# Patient Record
Sex: FEMALE
[De-identification: names, ages, dates, MRNs, and addresses within clinical notes are randomized per-mention and may not be internally consistent; named-entity substitution may affect disease eponyms.]

---

## 2020-04-04 ENCOUNTER — APPOINTMENT (OUTPATIENT)
Dept: DISASTER EMERGENCY | Facility: CLINIC | Age: 65
End: 2020-04-04

## 2021-09-09 PROBLEM — Z00.00 ENCOUNTER FOR PREVENTIVE HEALTH EXAMINATION: Status: ACTIVE | Noted: 2021-09-09

## 2022-01-27 ENCOUNTER — OUTPATIENT (OUTPATIENT)
Dept: OUTPATIENT SERVICES | Facility: HOSPITAL | Age: 67
LOS: 1 days | End: 2022-01-27
Payer: MEDICARE

## 2022-01-27 VITALS
WEIGHT: 198.42 LBS | TEMPERATURE: 97 F | OXYGEN SATURATION: 98 % | RESPIRATION RATE: 16 BRPM | HEART RATE: 70 BPM | DIASTOLIC BLOOD PRESSURE: 64 MMHG | HEIGHT: 65 IN | SYSTOLIC BLOOD PRESSURE: 125 MMHG

## 2022-01-27 DIAGNOSIS — Z29.9 ENCOUNTER FOR PROPHYLACTIC MEASURES, UNSPECIFIED: ICD-10-CM

## 2022-01-27 DIAGNOSIS — Z96.641 PRESENCE OF RIGHT ARTIFICIAL HIP JOINT: Chronic | ICD-10-CM

## 2022-01-27 DIAGNOSIS — Z63.32 OTHER ABSENCE OF FAMILY MEMBER: ICD-10-CM

## 2022-01-27 DIAGNOSIS — Z98.890 OTHER SPECIFIED POSTPROCEDURAL STATES: Chronic | ICD-10-CM

## 2022-01-27 DIAGNOSIS — Z01.818 ENCOUNTER FOR OTHER PREPROCEDURAL EXAMINATION: ICD-10-CM

## 2022-01-27 LAB
A1C WITH ESTIMATED AVERAGE GLUCOSE RESULT: 6.5 % — HIGH (ref 4–5.6)
ALBUMIN SERPL ELPH-MCNC: 3.2 G/DL — LOW (ref 3.3–5)
ALP SERPL-CCNC: 106 U/L — SIGNIFICANT CHANGE UP (ref 40–120)
ALT FLD-CCNC: 24 U/L — SIGNIFICANT CHANGE UP (ref 12–78)
ANION GAP SERPL CALC-SCNC: 5 MMOL/L — SIGNIFICANT CHANGE UP (ref 5–17)
APPEARANCE UR: CLEAR — SIGNIFICANT CHANGE UP
APTT BLD: 25.4 SEC — LOW (ref 27.5–35.5)
AST SERPL-CCNC: 15 U/L — SIGNIFICANT CHANGE UP (ref 15–37)
BASOPHILS # BLD AUTO: 0.07 K/UL — SIGNIFICANT CHANGE UP (ref 0–0.2)
BASOPHILS NFR BLD AUTO: 0.9 % — SIGNIFICANT CHANGE UP (ref 0–2)
BILIRUB SERPL-MCNC: 0.5 MG/DL — SIGNIFICANT CHANGE UP (ref 0.2–1.2)
BILIRUB UR-MCNC: NEGATIVE — SIGNIFICANT CHANGE UP
BUN SERPL-MCNC: 15 MG/DL — SIGNIFICANT CHANGE UP (ref 7–23)
CALCIUM SERPL-MCNC: 9.2 MG/DL — SIGNIFICANT CHANGE UP (ref 8.5–10.1)
CHLORIDE SERPL-SCNC: 110 MMOL/L — HIGH (ref 96–108)
CO2 SERPL-SCNC: 24 MMOL/L — SIGNIFICANT CHANGE UP (ref 22–31)
COLOR SPEC: YELLOW — SIGNIFICANT CHANGE UP
CREAT SERPL-MCNC: 0.75 MG/DL — SIGNIFICANT CHANGE UP (ref 0.5–1.3)
DIFF PNL FLD: NEGATIVE — SIGNIFICANT CHANGE UP
EOSINOPHIL # BLD AUTO: 0.24 K/UL — SIGNIFICANT CHANGE UP (ref 0–0.5)
EOSINOPHIL NFR BLD AUTO: 3.1 % — SIGNIFICANT CHANGE UP (ref 0–6)
ESTIMATED AVERAGE GLUCOSE: 140 MG/DL — HIGH (ref 68–114)
GLUCOSE SERPL-MCNC: 98 MG/DL — SIGNIFICANT CHANGE UP (ref 70–99)
GLUCOSE UR QL: NEGATIVE — SIGNIFICANT CHANGE UP
HCT VFR BLD CALC: 38.4 % — SIGNIFICANT CHANGE UP (ref 34.5–45)
HGB BLD-MCNC: 12.2 G/DL — SIGNIFICANT CHANGE UP (ref 11.5–15.5)
IMM GRANULOCYTES NFR BLD AUTO: 0.3 % — SIGNIFICANT CHANGE UP (ref 0–1.5)
INR BLD: 1.04 RATIO — SIGNIFICANT CHANGE UP (ref 0.88–1.16)
KETONES UR-MCNC: NEGATIVE — SIGNIFICANT CHANGE UP
LEUKOCYTE ESTERASE UR-ACNC: ABNORMAL
LYMPHOCYTES # BLD AUTO: 2.32 K/UL — SIGNIFICANT CHANGE UP (ref 1–3.3)
LYMPHOCYTES # BLD AUTO: 30.1 % — SIGNIFICANT CHANGE UP (ref 13–44)
MCHC RBC-ENTMCNC: 28.4 PG — SIGNIFICANT CHANGE UP (ref 27–34)
MCHC RBC-ENTMCNC: 31.8 GM/DL — LOW (ref 32–36)
MCV RBC AUTO: 89.5 FL — SIGNIFICANT CHANGE UP (ref 80–100)
MONOCYTES # BLD AUTO: 0.75 K/UL — SIGNIFICANT CHANGE UP (ref 0–0.9)
MONOCYTES NFR BLD AUTO: 9.7 % — SIGNIFICANT CHANGE UP (ref 2–14)
NEUTROPHILS # BLD AUTO: 4.3 K/UL — SIGNIFICANT CHANGE UP (ref 1.8–7.4)
NEUTROPHILS NFR BLD AUTO: 55.9 % — SIGNIFICANT CHANGE UP (ref 43–77)
NITRITE UR-MCNC: NEGATIVE — SIGNIFICANT CHANGE UP
PH UR: 6 — SIGNIFICANT CHANGE UP (ref 5–8)
PLATELET # BLD AUTO: 251 K/UL — SIGNIFICANT CHANGE UP (ref 150–400)
POTASSIUM SERPL-MCNC: 4 MMOL/L — SIGNIFICANT CHANGE UP (ref 3.5–5.3)
POTASSIUM SERPL-SCNC: 4 MMOL/L — SIGNIFICANT CHANGE UP (ref 3.5–5.3)
PROT SERPL-MCNC: 7.8 GM/DL — SIGNIFICANT CHANGE UP (ref 6–8.3)
PROT UR-MCNC: NEGATIVE — SIGNIFICANT CHANGE UP
PROTHROM AB SERPL-ACNC: 12.1 SEC — SIGNIFICANT CHANGE UP (ref 10.6–13.6)
RBC # BLD: 4.29 M/UL — SIGNIFICANT CHANGE UP (ref 3.8–5.2)
RBC # FLD: 13.5 % — SIGNIFICANT CHANGE UP (ref 10.3–14.5)
SODIUM SERPL-SCNC: 139 MMOL/L — SIGNIFICANT CHANGE UP (ref 135–145)
SP GR SPEC: 1 — LOW (ref 1.01–1.02)
UROBILINOGEN FLD QL: NEGATIVE — SIGNIFICANT CHANGE UP
WBC # BLD: 7.7 K/UL — SIGNIFICANT CHANGE UP (ref 3.8–10.5)
WBC # FLD AUTO: 7.7 K/UL — SIGNIFICANT CHANGE UP (ref 3.8–10.5)

## 2022-01-27 PROCEDURE — 86900 BLOOD TYPING SEROLOGIC ABO: CPT

## 2022-01-27 PROCEDURE — 81001 URINALYSIS AUTO W/SCOPE: CPT

## 2022-01-27 PROCEDURE — 86901 BLOOD TYPING SEROLOGIC RH(D): CPT

## 2022-01-27 PROCEDURE — 87086 URINE CULTURE/COLONY COUNT: CPT

## 2022-01-27 PROCEDURE — 36415 COLL VENOUS BLD VENIPUNCTURE: CPT

## 2022-01-27 PROCEDURE — 87641 MR-STAPH DNA AMP PROBE: CPT

## 2022-01-27 PROCEDURE — 83036 HEMOGLOBIN GLYCOSYLATED A1C: CPT

## 2022-01-27 PROCEDURE — 71046 X-RAY EXAM CHEST 2 VIEWS: CPT

## 2022-01-27 PROCEDURE — 85730 THROMBOPLASTIN TIME PARTIAL: CPT

## 2022-01-27 PROCEDURE — 85025 COMPLETE CBC W/AUTO DIFF WBC: CPT

## 2022-01-27 PROCEDURE — 71046 X-RAY EXAM CHEST 2 VIEWS: CPT | Mod: 26

## 2022-01-27 PROCEDURE — 87640 STAPH A DNA AMP PROBE: CPT

## 2022-01-27 PROCEDURE — 93005 ELECTROCARDIOGRAM TRACING: CPT

## 2022-01-27 PROCEDURE — 86850 RBC ANTIBODY SCREEN: CPT

## 2022-01-27 PROCEDURE — 80053 COMPREHEN METABOLIC PANEL: CPT

## 2022-01-27 PROCEDURE — 85610 PROTHROMBIN TIME: CPT

## 2022-01-27 PROCEDURE — G0463: CPT | Mod: 25

## 2022-01-27 PROCEDURE — 93010 ELECTROCARDIOGRAM REPORT: CPT

## 2022-01-27 NOTE — H&P PST ADULT - HISTORY OF PRESENT ILLNESS
66 y.o  female presents to PST with hx of lower back pain. Patient reports "back issues " for years. She states her back pain has progressively worsened over the years. She has treated pain conservatively with injections and exercises. Patient now has difficulty walking or standing for any length of time, numbness in feet, leg weakness  and sciatica. She is now opting for surgical intervention .Scheduled for L2-3-4-5 Laminectomy and Instrumented Fusion, Local Bone Graft, INFUSE

## 2022-01-27 NOTE — H&P PST ADULT - ASSESSMENT
66 y.o female scheduled for  66 y.o female scheduled for  L2-3-4-5 Laminectomy and Instrumented Fusion, Local Bone Graft, INFUSE   Plan  1. Stop all NSAIDS, herbal supplements and vitamins for 7 days.  2. NPO at midnight.  3. Take the following medications Levothyroxine with small sips of water on the morning of your procedure/surgery.  4. Use EZ sponges as directed  5. Use mupirocin as directed  6. Labs, EKG, CXR as per surgeon  7. PMD NILAY Bernstein  visit for optimization prior to surgery as per surgeon.  8. COVID swab appt: 2022    CAPRINI SCORE    AGE RELATED RISK FACTORS                                                       MOBILITY RELATED FACTORS  [ ] Age 41-60 years                                            (1 Point)                  [ ] Bed rest                                                        (1 Point)  [x ] Age: 61-74 years                                           (2 Points)                [ ] Plaster cast                                                   (2 Points)  [ ] Age= 75 years                                              (3 Points)                 [ ] Bed bound for more than 72 hours                   (2 Points)    DISEASE RELATED RISK FACTORS                                               GENDER SPECIFIC FACTORS  [ ] Edema in the lower extremities                       (1 Point)                  [ ] Pregnancy                                                     (1 Point)  [ x] Varicose veins                                               (1 Point)                  [ ] Post-partum < 6 weeks                                   (1 Point)             [x ] BMI > 25 Kg/m2                                            (1 Point)                  [ ] Hormonal therapy  or oral contraception            (1 Point)                 [ ] Sepsis (in the previous month)                        (1 Point)                  [ ] History of pregnancy complications  [ ] Pneumonia or serious lung disease                                               [ ] Unexplained or recurrent                       (1 Point)           (in the previous month)                               (1 Point)  [ ] Abnormal pulmonary function test                     (1 Point)                 SURGERY RELATED RISK FACTORS  [ ] Acute myocardial infarction                              (1 Point)                 [ ]  Section                                            (1 Point)  [ ] Congestive heart failure (in the previous month)  (1 Point)                 [ ] Minor surgery                                                 (1 Point)   [ ] Inflammatory bowel disease                             (1 Point)                 [ ] Arthroscopic surgery                                        (2 Points)  [ ] Central venous access                                    (2 Points)                [x ] General surgery lasting more than 45 minutes   (2 Points)       [ ] Stroke (in the previous month)                          (5 Points)               [ ] Elective arthroplasty                                        (5 Points)                                                                                                                                               HEMATOLOGY RELATED FACTORS                                                 TRAUMA RELATED RISK FACTORS  [ ] Prior episodes of VTE                                     (3 Points)                 [ ] Fracture of the hip, pelvis, or leg                       (5 Points)  [ ] Positive family history for VTE                         (3 Points)                 [ ] Acute spinal cord injury (in the previous month)  (5 Points)  [ ] Prothrombin  A                                      (3 Points)                 [ ] Paralysis  (less than 1 month)                          (5 Points)  [ ] Factor V Leiden                                             (3 Points)                 [ ] Multiple Trauma within 1 month                         (5 Points)  [ ] Lupus anticoagulants                                     (3 Points)                                                           [ ] Anticardiolipin antibodies                                (3 Points)                                                       [ ] High homocysteine in the blood                      (3 Points)                                             [ ] Other congenital or acquired thrombophilia       (3 Points)                                                [ ] Heparin induced thrombocytopenia                  (3 Points)                                          Total Score [   6       ]    The Caprini score indicates that this patient is at high risk for a VTE event (score > = 6).    Ssurgical patients in this group will benefit from both pharmacologic prophylaxis and intermittent compression devices.    The surgical team will determine the balance between VTE risk and bleeding risk, and other clinical considerations.

## 2022-01-27 NOTE — H&P PST ADULT - NEGATIVE ENMT SYMPTOMS
no hearing difficulty/no ear pain/no sinus symptoms/no post-nasal discharge/no throat pain/no dysphagia

## 2022-01-27 NOTE — H&P PST ADULT - NSICDXPASTMEDICALHX_GEN_ALL_CORE_FT
PAST MEDICAL HISTORY:  H/O carpal tunnel syndrome     H/O neuropathy     Hemorrhoids     HTN (hypertension)     Hyperlipemia     Lumbar stenosis     Morbid obesity     Palpitations     Sciatica     Spondylolisthesis      PAST MEDICAL HISTORY:  Anxiety     H/O carpal tunnel syndrome     H/O neuropathy     Hemorrhoids     HTN (hypertension)     Hyperlipemia     Lumbar stenosis     Morbid obesity     Palpitations     Sciatica     Spondylolisthesis      PAST MEDICAL HISTORY:  Anxiety     H/O carpal tunnel syndrome     H/O neuropathy     Hemorrhoids     HTN (hypertension)     Hyperlipemia     Lumbar stenosis     Morbid obesity     Palpitations     Prediabetes     Sciatica     Spondylolisthesis

## 2022-01-27 NOTE — H&P PST ADULT - MAMMOGRAM, RESULTS OF LAST, PROFILE
PAST SURGICAL HISTORY:  Cataracts, bilateral prior extraction    Hernia, inguinal, right repair    Stented coronary artery 2009
normal
Statement Selected

## 2022-01-27 NOTE — H&P PST ADULT - NS PRO FEM REPRO PAP RESULTS
normal

## 2022-01-27 NOTE — H&P PST ADULT - NSICDXPASTSURGICALHX_GEN_ALL_CORE_FT
PAST SURGICAL HISTORY:  H/O colonoscopy age 49    H/O oral surgery dental extraction in prep for implant--5 yrs ago    History of total right hip replacement 12/2009

## 2022-01-28 DIAGNOSIS — Z01.818 ENCOUNTER FOR OTHER PREPROCEDURAL EXAMINATION: ICD-10-CM

## 2022-01-28 LAB
CULTURE RESULTS: SIGNIFICANT CHANGE UP
MRSA PCR RESULT.: SIGNIFICANT CHANGE UP
S AUREUS DNA NOSE QL NAA+PROBE: SIGNIFICANT CHANGE UP
SPECIMEN SOURCE: SIGNIFICANT CHANGE UP

## 2022-02-07 RX ORDER — MEPERIDINE HYDROCHLORIDE 50 MG/ML
12.5 INJECTION INTRAMUSCULAR; INTRAVENOUS; SUBCUTANEOUS
Refills: 0 | Status: DISCONTINUED | OUTPATIENT
Start: 2022-02-08 | End: 2022-02-08

## 2022-02-07 RX ORDER — GABAPENTIN 400 MG/1
300 CAPSULE ORAL ONCE
Refills: 0 | Status: COMPLETED | OUTPATIENT
Start: 2022-02-08 | End: 2022-02-08

## 2022-02-07 RX ORDER — HYDROMORPHONE HYDROCHLORIDE 2 MG/ML
0.5 INJECTION INTRAMUSCULAR; INTRAVENOUS; SUBCUTANEOUS
Refills: 0 | Status: DISCONTINUED | OUTPATIENT
Start: 2022-02-08 | End: 2022-02-09

## 2022-02-07 RX ORDER — ONDANSETRON 8 MG/1
4 TABLET, FILM COATED ORAL ONCE
Refills: 0 | Status: DISCONTINUED | OUTPATIENT
Start: 2022-02-08 | End: 2022-02-08

## 2022-02-07 RX ORDER — OXYCODONE HYDROCHLORIDE 5 MG/1
10 TABLET ORAL ONCE
Refills: 0 | Status: DISCONTINUED | OUTPATIENT
Start: 2022-02-08 | End: 2022-02-08

## 2022-02-07 RX ORDER — SODIUM CHLORIDE 9 MG/ML
1000 INJECTION, SOLUTION INTRAVENOUS
Refills: 0 | Status: DISCONTINUED | OUTPATIENT
Start: 2022-02-08 | End: 2022-02-08

## 2022-02-07 RX ORDER — ONDANSETRON 8 MG/1
4 TABLET, FILM COATED ORAL EVERY 6 HOURS
Refills: 0 | Status: DISCONTINUED | OUTPATIENT
Start: 2022-02-08 | End: 2022-02-12

## 2022-02-07 RX ORDER — FENTANYL CITRATE 50 UG/ML
50 INJECTION INTRAVENOUS
Refills: 0 | Status: DISCONTINUED | OUTPATIENT
Start: 2022-02-08 | End: 2022-02-08

## 2022-02-07 RX ORDER — HYDROMORPHONE HYDROCHLORIDE 2 MG/ML
30 INJECTION INTRAMUSCULAR; INTRAVENOUS; SUBCUTANEOUS
Refills: 0 | Status: DISCONTINUED | OUTPATIENT
Start: 2022-02-08 | End: 2022-02-09

## 2022-02-07 RX ORDER — CELECOXIB 200 MG/1
200 CAPSULE ORAL ONCE
Refills: 0 | Status: COMPLETED | OUTPATIENT
Start: 2022-02-08 | End: 2022-02-08

## 2022-02-07 RX ORDER — HYDROMORPHONE HYDROCHLORIDE 2 MG/ML
0.5 INJECTION INTRAMUSCULAR; INTRAVENOUS; SUBCUTANEOUS
Refills: 0 | Status: DISCONTINUED | OUTPATIENT
Start: 2022-02-08 | End: 2022-02-08

## 2022-02-07 RX ORDER — NALOXONE HYDROCHLORIDE 4 MG/.1ML
0.1 SPRAY NASAL
Refills: 0 | Status: DISCONTINUED | OUTPATIENT
Start: 2022-02-08 | End: 2022-02-12

## 2022-02-08 ENCOUNTER — INPATIENT (INPATIENT)
Facility: HOSPITAL | Age: 67
LOS: 3 days | Discharge: HOME | DRG: 457 | End: 2022-02-12
Attending: ORTHOPAEDIC SURGERY | Admitting: ORTHOPAEDIC SURGERY
Payer: MEDICARE

## 2022-02-08 VITALS
OXYGEN SATURATION: 98 % | HEART RATE: 68 BPM | TEMPERATURE: 98 F | HEIGHT: 65 IN | RESPIRATION RATE: 16 BRPM | SYSTOLIC BLOOD PRESSURE: 145 MMHG | DIASTOLIC BLOOD PRESSURE: 76 MMHG | WEIGHT: 196.21 LBS

## 2022-02-08 DIAGNOSIS — Z96.641 PRESENCE OF RIGHT ARTIFICIAL HIP JOINT: Chronic | ICD-10-CM

## 2022-02-08 DIAGNOSIS — Z98.890 OTHER SPECIFIED POSTPROCEDURAL STATES: Chronic | ICD-10-CM

## 2022-02-08 DIAGNOSIS — M48.062 SPINAL STENOSIS, LUMBAR REGION WITH NEUROGENIC CLAUDICATION: ICD-10-CM

## 2022-02-08 DIAGNOSIS — Z63.32 OTHER ABSENCE OF FAMILY MEMBER: ICD-10-CM

## 2022-02-08 LAB
ANION GAP SERPL CALC-SCNC: 7 MMOL/L — SIGNIFICANT CHANGE UP (ref 5–17)
BASOPHILS # BLD AUTO: 0.05 K/UL — SIGNIFICANT CHANGE UP (ref 0–0.2)
BASOPHILS NFR BLD AUTO: 0.6 % — SIGNIFICANT CHANGE UP (ref 0–2)
BUN SERPL-MCNC: 14 MG/DL — SIGNIFICANT CHANGE UP (ref 7–23)
CALCIUM SERPL-MCNC: 8.9 MG/DL — SIGNIFICANT CHANGE UP (ref 8.5–10.1)
CHLORIDE SERPL-SCNC: 111 MMOL/L — HIGH (ref 96–108)
CO2 SERPL-SCNC: 24 MMOL/L — SIGNIFICANT CHANGE UP (ref 22–31)
CREAT SERPL-MCNC: 0.93 MG/DL — SIGNIFICANT CHANGE UP (ref 0.5–1.3)
EOSINOPHIL # BLD AUTO: 0.01 K/UL — SIGNIFICANT CHANGE UP (ref 0–0.5)
EOSINOPHIL NFR BLD AUTO: 0.1 % — SIGNIFICANT CHANGE UP (ref 0–6)
GLUCOSE SERPL-MCNC: 195 MG/DL — HIGH (ref 70–99)
HCT VFR BLD CALC: 35.4 % — SIGNIFICANT CHANGE UP (ref 34.5–45)
HGB BLD-MCNC: 11.4 G/DL — LOW (ref 11.5–15.5)
IMM GRANULOCYTES NFR BLD AUTO: 1.3 % — SIGNIFICANT CHANGE UP (ref 0–1.5)
LYMPHOCYTES # BLD AUTO: 0.91 K/UL — LOW (ref 1–3.3)
LYMPHOCYTES # BLD AUTO: 11.5 % — LOW (ref 13–44)
MCHC RBC-ENTMCNC: 29.1 PG — SIGNIFICANT CHANGE UP (ref 27–34)
MCHC RBC-ENTMCNC: 32.2 GM/DL — SIGNIFICANT CHANGE UP (ref 32–36)
MCV RBC AUTO: 90.3 FL — SIGNIFICANT CHANGE UP (ref 80–100)
MONOCYTES # BLD AUTO: 0.08 K/UL — SIGNIFICANT CHANGE UP (ref 0–0.9)
MONOCYTES NFR BLD AUTO: 1 % — LOW (ref 2–14)
NEUTROPHILS # BLD AUTO: 6.76 K/UL — SIGNIFICANT CHANGE UP (ref 1.8–7.4)
NEUTROPHILS NFR BLD AUTO: 85.5 % — HIGH (ref 43–77)
PLATELET # BLD AUTO: 224 K/UL — SIGNIFICANT CHANGE UP (ref 150–400)
POTASSIUM SERPL-MCNC: 4.3 MMOL/L — SIGNIFICANT CHANGE UP (ref 3.5–5.3)
POTASSIUM SERPL-SCNC: 4.3 MMOL/L — SIGNIFICANT CHANGE UP (ref 3.5–5.3)
RBC # BLD: 3.92 M/UL — SIGNIFICANT CHANGE UP (ref 3.8–5.2)
RBC # FLD: 14 % — SIGNIFICANT CHANGE UP (ref 10.3–14.5)
SODIUM SERPL-SCNC: 142 MMOL/L — SIGNIFICANT CHANGE UP (ref 135–145)
WBC # BLD: 7.91 K/UL — SIGNIFICANT CHANGE UP (ref 3.8–10.5)
WBC # FLD AUTO: 7.91 K/UL — SIGNIFICANT CHANGE UP (ref 3.8–10.5)

## 2022-02-08 PROCEDURE — C1713: CPT

## 2022-02-08 PROCEDURE — 85027 COMPLETE CBC AUTOMATED: CPT

## 2022-02-08 PROCEDURE — 76000 FLUOROSCOPY <1 HR PHYS/QHP: CPT

## 2022-02-08 PROCEDURE — 85025 COMPLETE CBC W/AUTO DIFF WBC: CPT

## 2022-02-08 PROCEDURE — C1889: CPT

## 2022-02-08 PROCEDURE — 97162 PT EVAL MOD COMPLEX 30 MIN: CPT | Mod: GP

## 2022-02-08 PROCEDURE — 82962 GLUCOSE BLOOD TEST: CPT

## 2022-02-08 PROCEDURE — 97116 GAIT TRAINING THERAPY: CPT | Mod: GP

## 2022-02-08 PROCEDURE — 80048 BASIC METABOLIC PNL TOTAL CA: CPT

## 2022-02-08 PROCEDURE — 97110 THERAPEUTIC EXERCISES: CPT | Mod: GP

## 2022-02-08 PROCEDURE — 36415 COLL VENOUS BLD VENIPUNCTURE: CPT

## 2022-02-08 PROCEDURE — 97530 THERAPEUTIC ACTIVITIES: CPT | Mod: GP

## 2022-02-08 RX ORDER — ACETAMINOPHEN 500 MG
1000 TABLET ORAL ONCE
Refills: 0 | Status: COMPLETED | OUTPATIENT
Start: 2022-02-09 | End: 2022-02-09

## 2022-02-08 RX ORDER — SODIUM CHLORIDE 9 MG/ML
1000 INJECTION, SOLUTION INTRAVENOUS
Refills: 0 | Status: DISCONTINUED | OUTPATIENT
Start: 2022-02-08 | End: 2022-02-12

## 2022-02-08 RX ORDER — SENNA PLUS 8.6 MG/1
2 TABLET ORAL AT BEDTIME
Refills: 0 | Status: DISCONTINUED | OUTPATIENT
Start: 2022-02-08 | End: 2022-02-12

## 2022-02-08 RX ORDER — MAGNESIUM HYDROXIDE 400 MG/1
30 TABLET, CHEWABLE ORAL EVERY 12 HOURS
Refills: 0 | Status: DISCONTINUED | OUTPATIENT
Start: 2022-02-08 | End: 2022-02-12

## 2022-02-08 RX ORDER — CYCLOBENZAPRINE HYDROCHLORIDE 10 MG/1
10 TABLET, FILM COATED ORAL EVERY 8 HOURS
Refills: 0 | Status: DISCONTINUED | OUTPATIENT
Start: 2022-02-08 | End: 2022-02-12

## 2022-02-08 RX ORDER — ATORVASTATIN CALCIUM 80 MG/1
10 TABLET, FILM COATED ORAL DAILY
Refills: 0 | Status: DISCONTINUED | OUTPATIENT
Start: 2022-02-08 | End: 2022-02-12

## 2022-02-08 RX ORDER — ACETAMINOPHEN 500 MG
1000 TABLET ORAL ONCE
Refills: 0 | Status: COMPLETED | OUTPATIENT
Start: 2022-02-08 | End: 2022-02-08

## 2022-02-08 RX ORDER — CEFAZOLIN SODIUM 1 G
2000 VIAL (EA) INJECTION EVERY 8 HOURS
Refills: 0 | Status: COMPLETED | OUTPATIENT
Start: 2022-02-08 | End: 2022-02-09

## 2022-02-08 RX ORDER — LEVOTHYROXINE SODIUM 125 MCG
50 TABLET ORAL DAILY
Refills: 0 | Status: DISCONTINUED | OUTPATIENT
Start: 2022-02-08 | End: 2022-02-12

## 2022-02-08 RX ORDER — CELECOXIB 200 MG/1
200 CAPSULE ORAL EVERY 12 HOURS
Refills: 0 | Status: COMPLETED | OUTPATIENT
Start: 2022-02-08 | End: 2022-02-11

## 2022-02-08 RX ORDER — GABAPENTIN 400 MG/1
600 CAPSULE ORAL THREE TIMES A DAY
Refills: 0 | Status: DISCONTINUED | OUTPATIENT
Start: 2022-02-08 | End: 2022-02-12

## 2022-02-08 RX ORDER — METOPROLOL TARTRATE 50 MG
100 TABLET ORAL DAILY
Refills: 0 | Status: DISCONTINUED | OUTPATIENT
Start: 2022-02-08 | End: 2022-02-12

## 2022-02-08 RX ORDER — ONDANSETRON 8 MG/1
4 TABLET, FILM COATED ORAL EVERY 6 HOURS
Refills: 0 | Status: DISCONTINUED | OUTPATIENT
Start: 2022-02-08 | End: 2022-02-12

## 2022-02-08 RX ADMIN — Medication 400 MILLIGRAM(S): at 19:35

## 2022-02-08 RX ADMIN — CELECOXIB 200 MILLIGRAM(S): 200 CAPSULE ORAL at 06:56

## 2022-02-08 RX ADMIN — SENNA PLUS 2 TABLET(S): 8.6 TABLET ORAL at 21:58

## 2022-02-08 RX ADMIN — CYCLOBENZAPRINE HYDROCHLORIDE 10 MILLIGRAM(S): 10 TABLET, FILM COATED ORAL at 21:59

## 2022-02-08 RX ADMIN — SODIUM CHLORIDE 75 MILLILITER(S): 9 INJECTION, SOLUTION INTRAVENOUS at 12:22

## 2022-02-08 RX ADMIN — Medication 1000 MILLIGRAM(S): at 19:46

## 2022-02-08 RX ADMIN — OXYCODONE HYDROCHLORIDE 10 MILLIGRAM(S): 5 TABLET ORAL at 06:56

## 2022-02-08 RX ADMIN — Medication 100 MILLIGRAM(S): at 16:56

## 2022-02-08 RX ADMIN — OXYCODONE HYDROCHLORIDE 10 MILLIGRAM(S): 5 TABLET ORAL at 06:53

## 2022-02-08 RX ADMIN — Medication 100 MILLIGRAM(S): at 16:55

## 2022-02-08 RX ADMIN — HYDROMORPHONE HYDROCHLORIDE 30 MILLILITER(S): 2 INJECTION INTRAMUSCULAR; INTRAVENOUS; SUBCUTANEOUS at 12:20

## 2022-02-08 RX ADMIN — GABAPENTIN 300 MILLIGRAM(S): 400 CAPSULE ORAL at 06:53

## 2022-02-08 RX ADMIN — CELECOXIB 200 MILLIGRAM(S): 200 CAPSULE ORAL at 21:59

## 2022-02-08 RX ADMIN — HYDROMORPHONE HYDROCHLORIDE 30 MILLILITER(S): 2 INJECTION INTRAMUSCULAR; INTRAVENOUS; SUBCUTANEOUS at 12:28

## 2022-02-08 RX ADMIN — CELECOXIB 200 MILLIGRAM(S): 200 CAPSULE ORAL at 23:43

## 2022-02-08 RX ADMIN — GABAPENTIN 600 MILLIGRAM(S): 400 CAPSULE ORAL at 21:58

## 2022-02-08 RX ADMIN — HYDROMORPHONE HYDROCHLORIDE 0.5 MILLIGRAM(S): 2 INJECTION INTRAMUSCULAR; INTRAVENOUS; SUBCUTANEOUS at 13:30

## 2022-02-08 RX ADMIN — CELECOXIB 200 MILLIGRAM(S): 200 CAPSULE ORAL at 06:55

## 2022-02-08 NOTE — PATIENT PROFILE ADULT - FALL HARM RISK - RISK INTERVENTIONS
Assistance OOB with selected safe patient handling equipment/Assistance with ambulation/Communicate Fall Risk and Risk Factors to all staff, patient, and family/Discuss with provider need for PT consult/Monitor gait and stability/Reinforce activity limits and safety measures with patient and family/Visual Cue: Yellow wristband/Bed in lowest position, wheels locked, appropriate side rails in place/Call bell, personal items and telephone in reach/Instruct patient to call for assistance before getting out of bed or chair/Non-slip footwear when patient is out of bed/[place] to call system/Physically safe environment - no spills, clutter or unnecessary equipment/Purposeful Proactive Rounding/Room/bathroom lighting operational, light cord in reach

## 2022-02-08 NOTE — BRIEF OPERATIVE NOTE - NSICDXBRIEFPROCEDURE_GEN_ALL_CORE_FT
PROCEDURES:  Lumbar laminectomy with fusion using instrumentation at 2 levels 08-Feb-2022 12:36:51  Jazzy Joshua

## 2022-02-08 NOTE — BRIEF OPERATIVE NOTE - NSICDXBRIEFPREOP_GEN_ALL_CORE_FT
PRE-OP DIAGNOSIS:  Spinal stenosis, lumbar region, with neurogenic claudication 08-Feb-2022 12:37:37  Jazzy Joshua

## 2022-02-09 LAB
ANION GAP SERPL CALC-SCNC: 6 MMOL/L — SIGNIFICANT CHANGE UP (ref 5–17)
BASOPHILS # BLD AUTO: 0.03 K/UL — SIGNIFICANT CHANGE UP (ref 0–0.2)
BASOPHILS NFR BLD AUTO: 0.2 % — SIGNIFICANT CHANGE UP (ref 0–2)
BUN SERPL-MCNC: 17 MG/DL — SIGNIFICANT CHANGE UP (ref 7–23)
CALCIUM SERPL-MCNC: 8.6 MG/DL — SIGNIFICANT CHANGE UP (ref 8.5–10.1)
CHLORIDE SERPL-SCNC: 109 MMOL/L — HIGH (ref 96–108)
CO2 SERPL-SCNC: 25 MMOL/L — SIGNIFICANT CHANGE UP (ref 22–31)
CREAT SERPL-MCNC: 0.77 MG/DL — SIGNIFICANT CHANGE UP (ref 0.5–1.3)
EOSINOPHIL # BLD AUTO: 0 K/UL — SIGNIFICANT CHANGE UP (ref 0–0.5)
EOSINOPHIL NFR BLD AUTO: 0 % — SIGNIFICANT CHANGE UP (ref 0–6)
GLUCOSE SERPL-MCNC: 165 MG/DL — HIGH (ref 70–99)
HCT VFR BLD CALC: 30.1 % — LOW (ref 34.5–45)
HGB BLD-MCNC: 9.7 G/DL — LOW (ref 11.5–15.5)
IMM GRANULOCYTES NFR BLD AUTO: 0.4 % — SIGNIFICANT CHANGE UP (ref 0–1.5)
LYMPHOCYTES # BLD AUTO: 1.84 K/UL — SIGNIFICANT CHANGE UP (ref 1–3.3)
LYMPHOCYTES # BLD AUTO: 13.1 % — SIGNIFICANT CHANGE UP (ref 13–44)
MCHC RBC-ENTMCNC: 29 PG — SIGNIFICANT CHANGE UP (ref 27–34)
MCHC RBC-ENTMCNC: 32.2 GM/DL — SIGNIFICANT CHANGE UP (ref 32–36)
MCV RBC AUTO: 90.1 FL — SIGNIFICANT CHANGE UP (ref 80–100)
MONOCYTES # BLD AUTO: 1.01 K/UL — HIGH (ref 0–0.9)
MONOCYTES NFR BLD AUTO: 7.2 % — SIGNIFICANT CHANGE UP (ref 2–14)
NEUTROPHILS # BLD AUTO: 11.07 K/UL — HIGH (ref 1.8–7.4)
NEUTROPHILS NFR BLD AUTO: 79.1 % — HIGH (ref 43–77)
PLATELET # BLD AUTO: 207 K/UL — SIGNIFICANT CHANGE UP (ref 150–400)
POTASSIUM SERPL-MCNC: 4 MMOL/L — SIGNIFICANT CHANGE UP (ref 3.5–5.3)
POTASSIUM SERPL-SCNC: 4 MMOL/L — SIGNIFICANT CHANGE UP (ref 3.5–5.3)
RBC # BLD: 3.34 M/UL — LOW (ref 3.8–5.2)
RBC # FLD: 14.3 % — SIGNIFICANT CHANGE UP (ref 10.3–14.5)
SODIUM SERPL-SCNC: 140 MMOL/L — SIGNIFICANT CHANGE UP (ref 135–145)
WBC # BLD: 14 K/UL — HIGH (ref 3.8–10.5)
WBC # FLD AUTO: 14 K/UL — HIGH (ref 3.8–10.5)

## 2022-02-09 PROCEDURE — 99221 1ST HOSP IP/OBS SF/LOW 40: CPT

## 2022-02-09 RX ORDER — OXYCODONE HYDROCHLORIDE 5 MG/1
5 TABLET ORAL EVERY 4 HOURS
Refills: 0 | Status: DISCONTINUED | OUTPATIENT
Start: 2022-02-09 | End: 2022-02-12

## 2022-02-09 RX ORDER — HYDROMORPHONE HYDROCHLORIDE 2 MG/ML
0.5 INJECTION INTRAMUSCULAR; INTRAVENOUS; SUBCUTANEOUS EVERY 4 HOURS
Refills: 0 | Status: DISCONTINUED | OUTPATIENT
Start: 2022-02-09 | End: 2022-02-12

## 2022-02-09 RX ORDER — OXYCODONE HYDROCHLORIDE 5 MG/1
10 TABLET ORAL EVERY 4 HOURS
Refills: 0 | Status: DISCONTINUED | OUTPATIENT
Start: 2022-02-09 | End: 2022-02-12

## 2022-02-09 RX ADMIN — Medication 1000 MILLIGRAM(S): at 22:14

## 2022-02-09 RX ADMIN — GABAPENTIN 600 MILLIGRAM(S): 400 CAPSULE ORAL at 22:00

## 2022-02-09 RX ADMIN — CYCLOBENZAPRINE HYDROCHLORIDE 10 MILLIGRAM(S): 10 TABLET, FILM COATED ORAL at 05:42

## 2022-02-09 RX ADMIN — Medication 1000 MILLIGRAM(S): at 13:44

## 2022-02-09 RX ADMIN — CYCLOBENZAPRINE HYDROCHLORIDE 10 MILLIGRAM(S): 10 TABLET, FILM COATED ORAL at 22:00

## 2022-02-09 RX ADMIN — Medication 100 MILLIGRAM(S): at 00:01

## 2022-02-09 RX ADMIN — CELECOXIB 200 MILLIGRAM(S): 200 CAPSULE ORAL at 09:58

## 2022-02-09 RX ADMIN — ATORVASTATIN CALCIUM 10 MILLIGRAM(S): 80 TABLET, FILM COATED ORAL at 17:55

## 2022-02-09 RX ADMIN — SENNA PLUS 2 TABLET(S): 8.6 TABLET ORAL at 22:00

## 2022-02-09 RX ADMIN — Medication 100 MILLIGRAM(S): at 17:55

## 2022-02-09 RX ADMIN — GABAPENTIN 600 MILLIGRAM(S): 400 CAPSULE ORAL at 13:44

## 2022-02-09 RX ADMIN — Medication 50 MICROGRAM(S): at 05:42

## 2022-02-09 RX ADMIN — CYCLOBENZAPRINE HYDROCHLORIDE 10 MILLIGRAM(S): 10 TABLET, FILM COATED ORAL at 13:44

## 2022-02-09 RX ADMIN — SODIUM CHLORIDE 100 MILLILITER(S): 9 INJECTION, SOLUTION INTRAVENOUS at 08:45

## 2022-02-09 RX ADMIN — Medication 400 MILLIGRAM(S): at 22:00

## 2022-02-09 RX ADMIN — CELECOXIB 200 MILLIGRAM(S): 200 CAPSULE ORAL at 21:59

## 2022-02-09 RX ADMIN — Medication 400 MILLIGRAM(S): at 13:44

## 2022-02-09 RX ADMIN — OXYCODONE HYDROCHLORIDE 10 MILLIGRAM(S): 5 TABLET ORAL at 20:38

## 2022-02-09 RX ADMIN — CELECOXIB 200 MILLIGRAM(S): 200 CAPSULE ORAL at 22:14

## 2022-02-09 RX ADMIN — Medication 400 MILLIGRAM(S): at 05:42

## 2022-02-09 RX ADMIN — OXYCODONE HYDROCHLORIDE 10 MILLIGRAM(S): 5 TABLET ORAL at 20:08

## 2022-02-09 RX ADMIN — Medication 1000 MILLIGRAM(S): at 05:42

## 2022-02-09 RX ADMIN — GABAPENTIN 600 MILLIGRAM(S): 400 CAPSULE ORAL at 05:42

## 2022-02-09 NOTE — CONSULT NOTE ADULT - SUBJECTIVE AND OBJECTIVE BOX
PCP: Dr Tee Bernstein    CHIEF COMPLAINT: chronic back pain    HISTORY OF THE PRESENT ILLNESS: this is a 65 yo female with pmh: anxiety, neuropathy, HTN, HLD, lumbar stenosis, obesity, pre-diabetes, sciatica spondylolisthesis with cc of chronic back progressive pain worsening over years, treated conservatively without relief. Pt now has pain when standing or walking great distances, with numbness in feet, leg weakness and sciatica.  She is seen on [facility],, OOB geraldine hair, having moderate pain at incisional area, using PCA dilaudid , with good effect.  Denies any lightheadedness, dizziness, CP or sob.  We are consulted for medical management    PAST MEDICAL HISTORY: as above    PAST SURGICAL HISTORY: colonoscopy, oral surgery, right THR    FAMILY HISTORY:   Mother: dec age 72, colon Ca, father dec age 86, traumatic injurr    SOCIAL HISTORY: former smoker 1/2 ppd x 15 years, quit 28 years ago, social ETOH, no rec drug    ALLERGIES: NKDA    HOME MEDS: see med rec    REVIEW OF SYSTEMS:   All 10 systems reviewed in detailed and found to be negative with the exception of what has already been described above    MEDICATIONS  (STANDING):  acetaminophen   IVPB .. 1000 milliGRAM(s) IV Intermittent once  acetaminophen   IVPB .. 1000 milliGRAM(s) IV Intermittent once  atorvastatin Oral Tab/Cap - Peds 10 milliGRAM(s) Oral daily  celecoxib 200 milliGRAM(s) Oral every 12 hours  cyclobenzaprine 10 milliGRAM(s) Oral every 8 hours  gabapentin 600 milliGRAM(s) Oral three times a day  lactated ringers. 1000 milliLiter(s) (100 mL/Hr) IV Continuous <Continuous>  levothyroxine 50 MICROGram(s) Oral daily  metoprolol succinate  milliGRAM(s) Oral daily  senna 2 Tablet(s) Oral at bedtime    MEDICATIONS  (PRN):  HYDROmorphone  Injectable 0.5 milliGRAM(s) SubCutaneous every 4 hours PRN Severe Pain (7 - 10)  magnesium hydroxide Suspension 30 milliLiter(s) Oral every 12 hours PRN Constipation  naloxone Injectable 0.1 milliGRAM(s) IV Push every 3 minutes PRN For ANY of the following changes in patient status:  A. RR LESS THAN 10 breaths per minute, B. Oxygen saturation LESS THAN 90%, C. Sedation score of 6  ondansetron Injectable 4 milliGRAM(s) IV Push every 6 hours PRN Nausea  ondansetron Injectable 4 milliGRAM(s) IV Push every 6 hours PRN Nausea  oxyCODONE    IR 5 milliGRAM(s) Oral every 4 hours PRN Mild Pain (1 - 3)  oxyCODONE    IR 10 milliGRAM(s) Oral every 4 hours PRN Moderate Pain (4 - 6)      VITALS:  T(F): 98.6 (02-09-22 @ 08:55), Max: 98.6 (02-09-22 @ 08:55)  HR: 70 (02-09-22 @ 08:55) (64 - 89)  BP: 102/51 (02-09-22 @ 08:55) (101/51 - 127/62)  RR: 16 (02-09-22 @ 08:55) (10 - 18)  SpO2: 98% (02-09-22 @ 08:55) (97% - 100%)  Wt(kg): --    I&O's Summary    08 Feb 2022 07:01  -  09 Feb 2022 07:00  --------------------------------------------------------  IN: 3050 mL / OUT: 2030 mL / NET: 1020 mL        CAPILLARY BLOOD GLUCOSE      POCT Blood Glucose.: 193 mg/dL (08 Feb 2022 21:18)  POCT Blood Glucose.: 148 mg/dL (08 Feb 2022 12:09)    PHYSICAL EXAM:    GENERAL: Comfortable, no acute distress   HEAD:  Normocephalic, atraumatic  EYES: EOMI, PERRLA  HEENT: Moist mucous membranes  NECK: Supple, No JVD  NERVOUS SYSTEM:  Alert & Oriented X3, Motor Strength 5/5 B/L upper and lower extremities  CHEST/LUNG: Clear to auscultation bilaterally  HEART: Regular rate and rhythm  ABDOMEN: Soft, non tender, Nondistended, Bowel sounds present  GENITOURINARY: malhotra  EXTREMITIES:   No clubbing, cyanosis, or edema  MUSCULOSKELETAL- low posterior back dsg, minimal drainage, + GEOVANI  SKIN-no rash      LABS:                        9.7    14.00 )-----------( 207      ( 09 Feb 2022 08:11 )             30.1     02-09    140  |  109<H>  |  17  ----------------------------<  165<H>  4.0   |  25  |  0.77    Ca    8.6      09 Feb 2022 08:11          IMPRESSION: 65 yo female with above pmh a/w:  # spinal stenosis  # s/p lumbar lami with fusion and instrumentation   Pain control with Dilaudid PCA  PT eval  cont gabapentin, flexeril, celebrex  malhotra  GEOVANI: wound care per spine  monitor GEOVANI out put  Monitor cbc/bmp    #Acute blood loss   d/t surgery   no need for transfusion presently   monitor hh    # Leukocytosis  likely reactive  afebrile  monitor wbc    # HTN  cont bb      #HLD  cont statin    # H/o prediabetes  A1 c 6.5   monitor glu    # hypothyroidism  cont levothyroxine    # Vte prophylaxis   hold chemical prophylaxis until ok with surgery   venodynes  amb    Pt has not been vaccinated with Covid-19     Thank you for the consult, will follow       PCP: Dr Tee Bernstein    CHIEF COMPLAINT: chronic back pain    HISTORY OF THE PRESENT ILLNESS: this is a 67 yo female with pmh: HTN, HLD, anxiety, Lumbar spinal stenosis with peripheral neuropathy,  obesity, pre-diabetes, sciatica spondylolisthesis with cc of chronic back progressive pain worsening over years, treated conservatively without relief. Pt now has pain when standing or walking great distances, with numbness in feet, leg weakness and sciatica.  She is seen on [facility],, OOB to chair, having moderate pain at incisional area, using PCA dilaudid , with good effect.  Denies any lightheadedness, dizziness, CP or sob.  We are consulted for medical management    PAST MEDICAL HISTORY: as above    PAST SURGICAL HISTORY: colonoscopy, oral surgery, right THR    FAMILY HISTORY:   Mother: dec age 72, colon Ca, father dec age 86, traumatic injurr    SOCIAL HISTORY: former smoker 1/2 ppd x 15 years, quit 28 years ago, social ETOH, no rec drug    ALLERGIES: NKDA    HOME MEDS: see med rec    REVIEW OF SYSTEMS:   All 10 systems reviewed in detailed and found to be negative with the exception of what has already been described above      VITALS:  T(F): 98.6 (02-09-22 @ 08:55), Max: 98.6 (02-09-22 @ 08:55)  HR: 70 (02-09-22 @ 08:55) (64 - 89)  BP: 102/51 (02-09-22 @ 08:55) (101/51 - 127/62)  RR: 16 (02-09-22 @ 08:55) (10 - 18)  SpO2: 98% (02-09-22 @ 08:55) (97% - 100%)    PHYSICAL EXAM:    GENERAL: Comfortable, no acute distress   HEAD:  Normocephalic, atraumatic  EYES: EOMI, PERRLA  HEENT: Moist mucous membranes  NECK: Supple, No JVD  NERVOUS SYSTEM:  Alert & Oriented X3, Motor Strength 5/5 B/L upper and lower extremities  CHEST/LUNG: Clear to auscultation bilaterally  HEART: Regular rate and rhythm  ABDOMEN: Soft, non tender, Nondistended, Bowel sounds present  GENITOURINARY: malhotra  EXTREMITIES:   No clubbing, cyanosis, or edema  MUSCULOSKELETAL- low posterior back dsg, minimal drainage, + GEOVANI  SKIN-no rash      LABS:                        9.7    14.00 )-----------( 207      ( 09 Feb 2022 08:11 )             30.1     02-09    140  |  109<H>  |  17  ----------------------------<  165<H>  4.0   |  25  |  0.77    Ca    8.6      09 Feb 2022 08:11    MEDICATIONS  (STANDING):  acetaminophen   IVPB .. 1000 milliGRAM(s) IV Intermittent once  acetaminophen   IVPB .. 1000 milliGRAM(s) IV Intermittent once  atorvastatin Oral Tab/Cap - Peds 10 milliGRAM(s) Oral daily  celecoxib 200 milliGRAM(s) Oral every 12 hours  cyclobenzaprine 10 milliGRAM(s) Oral every 8 hours  gabapentin 600 milliGRAM(s) Oral three times a day  lactated ringers. 1000 milliLiter(s) (100 mL/Hr) IV Continuous <Continuous>  levothyroxine 50 MICROGram(s) Oral daily  metoprolol succinate  milliGRAM(s) Oral daily  senna 2 Tablet(s) Oral at bedtime    MEDICATIONS  (PRN):  HYDROmorphone  Injectable 0.5 milliGRAM(s) SubCutaneous every 4 hours PRN Severe Pain (7 - 10)  magnesium hydroxide Suspension 30 milliLiter(s) Oral every 12 hours PRN Constipation  naloxone Injectable 0.1 milliGRAM(s) IV Push every 3 minutes PRN For ANY of the following changes in patient status:  A. RR LESS THAN 10 breaths per minute, B. Oxygen saturation LESS THAN 90%, C. Sedation score of 6  ondansetron Injectable 4 milliGRAM(s) IV Push every 6 hours PRN Nausea  ondansetron Injectable 4 milliGRAM(s) IV Push every 6 hours PRN Nausea  oxyCODONE    IR 10 milliGRAM(s) Oral every 4 hours PRN Moderate Pain (4 - 6)  oxyCODONE    IR 5 milliGRAM(s) Oral every 4 hours PRN Mild Pain (1 - 3)        IMPRESSION:   67 yo female with above pmh a/w:    # spinal stenosis  # s/p lumbar lami with fusion and instrumentation POD#1  Pain control with Dilaudid PCA  PT eval  cont gabapentin, flexeril, celebrex  malhotra  GEOVANI: wound care per spine  monitor GEOVANI out put  Monitor cbc/bmp    #Acute blood loss   d/t surgery   asymptomatic   no need for transfusion presently   monitor hh    # Leukocytosis  likely reactive  afebrile  monitor wbc    # HTN  cont bb      #HLD  cont statin    #DMII:  -hgba1c 6.5  -consistent carb diet  -ss    # hypothyroidism  cont levothyroxine    # Vte prophylaxis  -venodynes    Pt has not been vaccinated with Covid-19     Thank you for the consult, will follow

## 2022-02-09 NOTE — PHYSICAL THERAPY INITIAL EVALUATION ADULT - MODALITIES TREATMENT COMMENTS
Pt OOB in chair, +alarm, LSO+, PCA and IV+, GEOVANI drain+, Lozano+, NAD, denies dizziness, pain 5/10 after session. RN aware.

## 2022-02-09 NOTE — PHYSICAL THERAPY INITIAL EVALUATION ADULT - GENERAL OBSERVATIONS, REHAB EVAL
Pt seen on [facility], NAD, reports +GEOVANI drain, +LSO, LB bandages c/d/i, BP sitting at /60 and after session 101/47. Denies dizziness after session. Pt reports 8/10 pain PCA encouraged, 1 pump this session

## 2022-02-09 NOTE — PHYSICAL THERAPY INITIAL EVALUATION ADULT - PERTINENT HX OF CURRENT PROBLEM, REHAB EVAL
Pt is a 65 y/o F, to  for spinal surgery due to severe lumbar stenosis, unstable spondylolisthesis, Lumbar laminectomy with fusion using instrumentation at 2 levels

## 2022-02-09 NOTE — CONSULT NOTE ADULT - ATTENDING COMMENTS
Pt seen and examined earlier today. d/w NP Alma Blackwood. Agree with documentation as above with changes made where appropriate.   pt admitted for elective lumbar lami/fusion.   doing well post op  ambulated with physical therapy.   continue pca for pain control  incentive spirometry  bowel regimen.   physical therapy  monitor zoila outpt, malhotra outpt  monitor h/h  continue bb for htn, statin for hld, ss for DM, synthroid for hypothyroid.   venodynes for dvt px    thank you, will follow.

## 2022-02-09 NOTE — PHYSICAL THERAPY INITIAL EVALUATION ADULT - PRECAUTIONS/LIMITATIONS, REHAB EVAL
fall precautions/sternal precautions/surgical precautions fall precautions/spinal precautions/surgical precautions

## 2022-02-10 LAB
ANION GAP SERPL CALC-SCNC: 1 MMOL/L — LOW (ref 5–17)
BUN SERPL-MCNC: 15 MG/DL — SIGNIFICANT CHANGE UP (ref 7–23)
CALCIUM SERPL-MCNC: 8.8 MG/DL — SIGNIFICANT CHANGE UP (ref 8.5–10.1)
CHLORIDE SERPL-SCNC: 114 MMOL/L — HIGH (ref 96–108)
CO2 SERPL-SCNC: 29 MMOL/L — SIGNIFICANT CHANGE UP (ref 22–31)
CREAT SERPL-MCNC: 0.69 MG/DL — SIGNIFICANT CHANGE UP (ref 0.5–1.3)
GLUCOSE SERPL-MCNC: 116 MG/DL — HIGH (ref 70–99)
HCT VFR BLD CALC: 29.5 % — LOW (ref 34.5–45)
HGB BLD-MCNC: 9.3 G/DL — LOW (ref 11.5–15.5)
MCHC RBC-ENTMCNC: 29 PG — SIGNIFICANT CHANGE UP (ref 27–34)
MCHC RBC-ENTMCNC: 31.5 GM/DL — LOW (ref 32–36)
MCV RBC AUTO: 91.9 FL — SIGNIFICANT CHANGE UP (ref 80–100)
PLATELET # BLD AUTO: 180 K/UL — SIGNIFICANT CHANGE UP (ref 150–400)
POTASSIUM SERPL-MCNC: 4.1 MMOL/L — SIGNIFICANT CHANGE UP (ref 3.5–5.3)
POTASSIUM SERPL-SCNC: 4.1 MMOL/L — SIGNIFICANT CHANGE UP (ref 3.5–5.3)
RBC # BLD: 3.21 M/UL — LOW (ref 3.8–5.2)
RBC # FLD: 14.6 % — HIGH (ref 10.3–14.5)
SODIUM SERPL-SCNC: 144 MMOL/L — SIGNIFICANT CHANGE UP (ref 135–145)
WBC # BLD: 10.59 K/UL — HIGH (ref 3.8–10.5)
WBC # FLD AUTO: 10.59 K/UL — HIGH (ref 3.8–10.5)

## 2022-02-10 PROCEDURE — 99232 SBSQ HOSP IP/OBS MODERATE 35: CPT

## 2022-02-10 RX ADMIN — CYCLOBENZAPRINE HYDROCHLORIDE 10 MILLIGRAM(S): 10 TABLET, FILM COATED ORAL at 05:19

## 2022-02-10 RX ADMIN — HYDROMORPHONE HYDROCHLORIDE 0.5 MILLIGRAM(S): 2 INJECTION INTRAMUSCULAR; INTRAVENOUS; SUBCUTANEOUS at 11:16

## 2022-02-10 RX ADMIN — CYCLOBENZAPRINE HYDROCHLORIDE 10 MILLIGRAM(S): 10 TABLET, FILM COATED ORAL at 14:44

## 2022-02-10 RX ADMIN — SODIUM CHLORIDE 100 MILLILITER(S): 9 INJECTION, SOLUTION INTRAVENOUS at 09:28

## 2022-02-10 RX ADMIN — GABAPENTIN 600 MILLIGRAM(S): 400 CAPSULE ORAL at 05:19

## 2022-02-10 RX ADMIN — OXYCODONE HYDROCHLORIDE 10 MILLIGRAM(S): 5 TABLET ORAL at 10:20

## 2022-02-10 RX ADMIN — GABAPENTIN 600 MILLIGRAM(S): 400 CAPSULE ORAL at 21:22

## 2022-02-10 RX ADMIN — CELECOXIB 200 MILLIGRAM(S): 200 CAPSULE ORAL at 09:27

## 2022-02-10 RX ADMIN — OXYCODONE HYDROCHLORIDE 10 MILLIGRAM(S): 5 TABLET ORAL at 22:05

## 2022-02-10 RX ADMIN — CELECOXIB 200 MILLIGRAM(S): 200 CAPSULE ORAL at 21:21

## 2022-02-10 RX ADMIN — OXYCODONE HYDROCHLORIDE 10 MILLIGRAM(S): 5 TABLET ORAL at 09:28

## 2022-02-10 RX ADMIN — CYCLOBENZAPRINE HYDROCHLORIDE 10 MILLIGRAM(S): 10 TABLET, FILM COATED ORAL at 21:21

## 2022-02-10 RX ADMIN — OXYCODONE HYDROCHLORIDE 10 MILLIGRAM(S): 5 TABLET ORAL at 18:45

## 2022-02-10 RX ADMIN — HYDROMORPHONE HYDROCHLORIDE 0.5 MILLIGRAM(S): 2 INJECTION INTRAMUSCULAR; INTRAVENOUS; SUBCUTANEOUS at 11:30

## 2022-02-10 RX ADMIN — SENNA PLUS 2 TABLET(S): 8.6 TABLET ORAL at 21:22

## 2022-02-10 RX ADMIN — CELECOXIB 200 MILLIGRAM(S): 200 CAPSULE ORAL at 21:24

## 2022-02-10 RX ADMIN — OXYCODONE HYDROCHLORIDE 10 MILLIGRAM(S): 5 TABLET ORAL at 17:55

## 2022-02-10 RX ADMIN — OXYCODONE HYDROCHLORIDE 10 MILLIGRAM(S): 5 TABLET ORAL at 23:01

## 2022-02-10 RX ADMIN — ATORVASTATIN CALCIUM 10 MILLIGRAM(S): 80 TABLET, FILM COATED ORAL at 21:22

## 2022-02-10 RX ADMIN — Medication 50 MICROGRAM(S): at 05:19

## 2022-02-10 RX ADMIN — GABAPENTIN 600 MILLIGRAM(S): 400 CAPSULE ORAL at 14:44

## 2022-02-10 NOTE — PROGRESS NOTE ADULT - ASSESSMENT
L2-5 laminectomy, fusion  - stable A/P: s/p L2-5 laminectomy, fusion  - stable  1. PT/mobilization  2. drain care  3. bowel regimen

## 2022-02-10 NOTE — PROGRESS NOTE ADULT - SUBJECTIVE AND OBJECTIVE BOX
[facility]                                                           Orthopedic Spine Progress Note      POST OPERATIVE DAY #: 2   STATUS POST: L2-5 laminectomy, fusion                Pre-Op Dx: Spinal stenosis, lumbar region, with neurogenic claudication      Post-Op Dx:  Spinal stenosis, lumbar      Prin. Procedure:      SUBJECTIVE: Patient seen and examined.     Pain (0-10):   Current Pain Management:  [ ] PCA   [ ] Po Analgesics [ ] IM /IV Anagesics     Vital Signs Last 24 Hrs  T(C): 36.9 (10 Feb 2022 05:10), Max: 37 (09 Feb 2022 08:55)  T(F): 98.5 (10 Feb 2022 05:10), Max: 98.6 (09 Feb 2022 08:55)  HR: 69 (10 Feb 2022 05:10) (69 - 81)  BP: 99/51 (10 Feb 2022 05:10) (99/51 - 118/58)  BP(mean): 61 (09 Feb 2022 08:55) (61 - 61)  RR: 16 (10 Feb 2022 05:10) (16 - 17)  SpO2: 98% (10 Feb 2022 05:10) (98% - 98%)  I&O's Detail    09 Feb 2022 07:01  -  10 Feb 2022 07:00  --------------------------------------------------------  IN:    IV PiggyBack: 100 mL    Lactated Ringers: 700 mL  Total IN: 800 mL    OUT:    Bulb (mL): 215 mL    Indwelling Catheter - Urethral (mL): 550 mL    Voided (mL): 350 mL  Total OUT: 1115 mL    Total NET: -315 mL          OBJECTIVE:         Wound /Dressing:   Cervical ROM:  Lumbar: ROM :  Neurological: A/O x               Sensation: [ ] intact to light touch  [ ] decreased:          Motor exam: [  ]          [ ] Upper extremity    Delt      Bicp       Tri      Wrist ext  Wrst Flex       Digit Ext Digit Flex                                     R         5/5        5/5        5/5       5/5            5/5            5/5       5/5          5/5                                     L          5/5        5/5        5/5       5/5            5/5            5/5       5/5          5/5         [ ] Lower ext.     Hip Flx  Hip Ext   Hip Abd  Hip Add Quad   Hamstrg   TA       EHL      GS                              R        5/5 5/5        5/5             5/5        5/5        5/5 5/5 5/5 5/5                              L         5/5 5/5 5/5             5/5        5/5 5/5 5/5 5/5 5/5                                                        [ ] Vascular :             Tension Signs:           Long Tract Findings:     RADIOLOGY & ADDITIONAL STUDIES:                                               LABS:                        9.3    10.59 )-----------( 180      ( 10 Feb 2022 07:52 )             29.5     02-10    144  |  114<H>  |  15  ----------------------------<  116<H>  4.1   |  29  |  0.69    Ca    8.8      10 Feb 2022 07:52          Blood Culture:   Wound Culture:                                                                      [facility]                                                           Orthopedic Spine Progress Note      POST OPERATIVE DAY #: 2   STATUS POST: L2-5 laminectomy, fusion                Pre-Op Dx: Spinal stenosis, lumbar region, with neurogenic claudication      Post-Op Dx:  Spinal stenosis, lumbar    SUBJECTIVE: Patient seen and examined, out of bed in chair, pain controlled, voiding, denies flatus    Current Pain Management:  [ ] PCA   [x] Po Analgesics [x] IM /IV Anagesics     Vital Signs Last 24 Hrs  T(C): 36.9 (10 Feb 2022 05:10), Max: 37 (09 Feb 2022 08:55)  T(F): 98.5 (10 Feb 2022 05:10), Max: 98.6 (09 Feb 2022 08:55)  HR: 69 (10 Feb 2022 05:10) (69 - 81)  BP: 99/51 (10 Feb 2022 05:10) (99/51 - 118/58)  BP(mean): 61 (09 Feb 2022 08:55) (61 - 61)  RR: 16 (10 Feb 2022 05:10) (16 - 17)  SpO2: 98% (10 Feb 2022 05:10) (98% - 98%)  I&O's Detail    09 Feb 2022 07:01  -  10 Feb 2022 07:00  --------------------------------------------------------  IN:    IV PiggyBack: 100 mL    Lactated Ringers: 700 mL  Total IN: 800 mL    OUT:    Bulb (mL): 215 mL    Indwelling Catheter - Urethral (mL): 550 mL    Voided (mL): 350 mL  Total OUT: 1115 mL    Total NET: -315 mL          OBJECTIVE:       Wound /Dressing: intact  Drain: 215cc - kept  Lumbar ROM: not tested  Neurological: A/O x 3              Sensation: [x] intact to light touch  [ ] decreased:          Motor exam: [x]           [x] Lower ext.         Hip Flx   Quad   Hamstrg         TA        EHL         GS                              R        5/5        5/5        5/5             5/5        5/5        5/5                                    L         5/5        5/5        5/5             5/5        5/5        5/5                                                             [x] Vascular: intact           Tension Signs: none          Long Tract Findings: none       LABS:                        9.3    10.59 )-----------( 180      ( 10 Feb 2022 07:52 )             29.5     02-10    144  |  114<H>  |  15  ----------------------------<  116<H>  4.1   |  29  |  0.69    Ca    8.8      10 Feb 2022 07:52

## 2022-02-10 NOTE — PROGRESS NOTE ADULT - SUBJECTIVE AND OBJECTIVE BOX
PCP: Dr Tee Bernstein    CHIEF COMPLAINT: chronic back pain    HISTORY OF THE PRESENT ILLNESS: this is a 67 yo female with pmh: HTN, HLD, anxiety, Lumbar spinal stenosis with peripheral neuropathy,  obesity, pre-diabetes, sciatica spondylolisthesis with cc of chronic back progressive pain worsening over years, treated conservatively without relief. Pt now has pain when standing or walking great distances, with numbness in feet, leg weakness and sciatica.  She is seen on [facility],, OOB to chair, having moderate pain at incisional area, using PCA dilaudid , with good effect.  Denies any lightheadedness, dizziness, CP or sob.  We are consulted for medical management    PAST MEDICAL HISTORY: as above  PAST SURGICAL HISTORY: colonoscopy, oral surgery, right THR  FAMILY HISTORY:   Mother: dec age 72, colon Ca, father dec age 86, traumatic injurr  SOCIAL HISTORY: former smoker 1/2 ppd x 15 years, quit 28 years ago, social ETOH, no rec drug  ALLERGIES: NKDA  HOME MEDS: see med rec    2/10/22- up in a chair, Lozano removed- voiding. Dressing was changed and dry. GEOVANI- 215cc    REVIEW OF SYSTEMS:   All 10 systems reviewed in detailed and found to be negative with the exception of what has already been described above    Vital Signs Last 24 Hrs  T(C): 36.4 (10 Feb 2022 09:16), Max: 36.9 (10 Feb 2022 05:10)  T(F): 97.5 (10 Feb 2022 09:16), Max: 98.5 (10 Feb 2022 05:10)  HR: 72 (10 Feb 2022 09:16) (69 - 81)  BP: 102/49 (10 Feb 2022 11:14) (99/51 - 118/58)  BP(mean): 67 (10 Feb 2022 09:16) (67 - 67)  RR: 16 (10 Feb 2022 11:14) (16 - 17)  SpO2: 98% (10 Feb 2022 11:14) (98% - 98%)    PHYSICAL EXAM:  GENERAL: Comfortable, no acute distress   HEAD:  Normocephalic, atraumatic  EYES: EOMI, PERRLA  HEENT: Moist mucous membranes  NECK: Supple, No JVD  NERVOUS SYSTEM:  Alert & Oriented X3, Motor Strength 5/5 B/L upper and lower extremities  CHEST/LUNG: Clear to auscultation bilaterally  HEART: Regular rate and rhythm  ABDOMEN: Soft, non tender, Nondistended, Bowel sounds present  GENITOURINARY: voiding  EXTREMITIES:   No clubbing, cyanosis, or edema  MUSCULOSKELETAL- low posterior back dsg, dry, GEOVANI+  SKIN-no rash    LABS:                        9.3    10.59 )-----------( 180      ( 10 Feb 2022 07:52 )             29.5     10 Feb 2022 07:52    144    |  114    |  15     ----------------------------<  116    4.1     |  29     |  0.69     Ca    8.8        10 Feb 2022 07:52    CAPILLARY BLOOD GLUCOSE  POCT Blood Glucose.: 150 mg/dL (09 Feb 2022 22:12)    MEDICATIONS  (STANDING):  atorvastatin Oral Tab/Cap - Peds 10 milliGRAM(s) Oral daily  celecoxib 200 milliGRAM(s) Oral every 12 hours  cyclobenzaprine 10 milliGRAM(s) Oral every 8 hours  gabapentin 600 milliGRAM(s) Oral three times a day  lactated ringers. 1000 milliLiter(s) (100 mL/Hr) IV Continuous <Continuous>  levothyroxine 50 MICROGram(s) Oral daily  metoprolol succinate  milliGRAM(s) Oral daily  senna 2 Tablet(s) Oral at bedtime    MEDICATIONS  (PRN):  HYDROmorphone  Injectable 0.5 milliGRAM(s) SubCutaneous every 4 hours PRN Severe Pain (7 - 10)  magnesium hydroxide Suspension 30 milliLiter(s) Oral every 12 hours PRN Constipation  naloxone Injectable 0.1 milliGRAM(s) IV Push every 3 minutes PRN For ANY of the following changes in patient status:  A. RR LESS THAN 10 breaths per minute, B. Oxygen saturation LESS THAN 90%, C. Sedation score of 6  ondansetron Injectable 4 milliGRAM(s) IV Push every 6 hours PRN Nausea  ondansetron Injectable 4 milliGRAM(s) IV Push every 6 hours PRN Nausea  oxyCODONE    IR 5 milliGRAM(s) Oral every 4 hours PRN Mild Pain (1 - 3)  oxyCODONE    IR 10 milliGRAM(s) Oral every 4 hours PRN Moderate Pain (4 - 6)    IMPRESSION:   67 yo female with above pmh a/w:  # spinal stenosis  # s/p lumbar lami with fusion and instrumentation POD#2  Spine f/u appreciated  Pain meds- off PCA, on oxy/ dilaudid prn  Lozano removed- voiding  Dressing changed and dry  Monitor GEOVANI drain output  Bowel regimen  Incentive spirometry  Ambulate    #Acute blood loss   d/t surgery   asymptomatic   no need for transfusion presently   monitor hh    # Leukocytosis  likely reactive  afebrile  monitor wbc    # HTN  cont bb    #HLD  cont statin    #DMII:  -hgba1c 6.5  -consistent carb diet  -ss    # hypothyroidism  cont levothyroxine    # Vte prophylaxis  -venodynes    Pt has not been vaccinated with Covid-19     #Dispo- likely home when clinically stable, per spine.

## 2022-02-11 LAB
ANION GAP SERPL CALC-SCNC: 4 MMOL/L — LOW (ref 5–17)
BUN SERPL-MCNC: 10 MG/DL — SIGNIFICANT CHANGE UP (ref 7–23)
CALCIUM SERPL-MCNC: 8.9 MG/DL — SIGNIFICANT CHANGE UP (ref 8.5–10.1)
CHLORIDE SERPL-SCNC: 109 MMOL/L — HIGH (ref 96–108)
CO2 SERPL-SCNC: 27 MMOL/L — SIGNIFICANT CHANGE UP (ref 22–31)
CREAT SERPL-MCNC: 0.66 MG/DL — SIGNIFICANT CHANGE UP (ref 0.5–1.3)
GLUCOSE SERPL-MCNC: 122 MG/DL — HIGH (ref 70–99)
HCT VFR BLD CALC: 31.9 % — LOW (ref 34.5–45)
HGB BLD-MCNC: 10.2 G/DL — LOW (ref 11.5–15.5)
MCHC RBC-ENTMCNC: 29.4 PG — SIGNIFICANT CHANGE UP (ref 27–34)
MCHC RBC-ENTMCNC: 32 GM/DL — SIGNIFICANT CHANGE UP (ref 32–36)
MCV RBC AUTO: 91.9 FL — SIGNIFICANT CHANGE UP (ref 80–100)
PLATELET # BLD AUTO: 207 K/UL — SIGNIFICANT CHANGE UP (ref 150–400)
POTASSIUM SERPL-MCNC: 4 MMOL/L — SIGNIFICANT CHANGE UP (ref 3.5–5.3)
POTASSIUM SERPL-SCNC: 4 MMOL/L — SIGNIFICANT CHANGE UP (ref 3.5–5.3)
RBC # BLD: 3.47 M/UL — LOW (ref 3.8–5.2)
RBC # FLD: 14.5 % — SIGNIFICANT CHANGE UP (ref 10.3–14.5)
SODIUM SERPL-SCNC: 140 MMOL/L — SIGNIFICANT CHANGE UP (ref 135–145)
WBC # BLD: 9.41 K/UL — SIGNIFICANT CHANGE UP (ref 3.8–10.5)
WBC # FLD AUTO: 9.41 K/UL — SIGNIFICANT CHANGE UP (ref 3.8–10.5)

## 2022-02-11 PROCEDURE — 99232 SBSQ HOSP IP/OBS MODERATE 35: CPT

## 2022-02-11 RX ADMIN — SENNA PLUS 2 TABLET(S): 8.6 TABLET ORAL at 21:10

## 2022-02-11 RX ADMIN — CYCLOBENZAPRINE HYDROCHLORIDE 10 MILLIGRAM(S): 10 TABLET, FILM COATED ORAL at 21:10

## 2022-02-11 RX ADMIN — OXYCODONE HYDROCHLORIDE 10 MILLIGRAM(S): 5 TABLET ORAL at 05:25

## 2022-02-11 RX ADMIN — CELECOXIB 200 MILLIGRAM(S): 200 CAPSULE ORAL at 09:42

## 2022-02-11 RX ADMIN — OXYCODONE HYDROCHLORIDE 10 MILLIGRAM(S): 5 TABLET ORAL at 09:41

## 2022-02-11 RX ADMIN — CELECOXIB 200 MILLIGRAM(S): 200 CAPSULE ORAL at 09:50

## 2022-02-11 RX ADMIN — OXYCODONE HYDROCHLORIDE 10 MILLIGRAM(S): 5 TABLET ORAL at 23:45

## 2022-02-11 RX ADMIN — OXYCODONE HYDROCHLORIDE 10 MILLIGRAM(S): 5 TABLET ORAL at 23:10

## 2022-02-11 RX ADMIN — GABAPENTIN 600 MILLIGRAM(S): 400 CAPSULE ORAL at 05:25

## 2022-02-11 RX ADMIN — GABAPENTIN 600 MILLIGRAM(S): 400 CAPSULE ORAL at 21:10

## 2022-02-11 RX ADMIN — OXYCODONE HYDROCHLORIDE 10 MILLIGRAM(S): 5 TABLET ORAL at 06:20

## 2022-02-11 RX ADMIN — CYCLOBENZAPRINE HYDROCHLORIDE 10 MILLIGRAM(S): 10 TABLET, FILM COATED ORAL at 05:25

## 2022-02-11 RX ADMIN — GABAPENTIN 600 MILLIGRAM(S): 400 CAPSULE ORAL at 14:27

## 2022-02-11 RX ADMIN — Medication 100 MILLIGRAM(S): at 17:14

## 2022-02-11 RX ADMIN — OXYCODONE HYDROCHLORIDE 10 MILLIGRAM(S): 5 TABLET ORAL at 18:09

## 2022-02-11 RX ADMIN — CELECOXIB 200 MILLIGRAM(S): 200 CAPSULE ORAL at 09:20

## 2022-02-11 RX ADMIN — ATORVASTATIN CALCIUM 10 MILLIGRAM(S): 80 TABLET, FILM COATED ORAL at 21:10

## 2022-02-11 RX ADMIN — OXYCODONE HYDROCHLORIDE 10 MILLIGRAM(S): 5 TABLET ORAL at 18:58

## 2022-02-11 RX ADMIN — CYCLOBENZAPRINE HYDROCHLORIDE 10 MILLIGRAM(S): 10 TABLET, FILM COATED ORAL at 14:27

## 2022-02-11 RX ADMIN — Medication 50 MICROGRAM(S): at 05:25

## 2022-02-11 RX ADMIN — OXYCODONE HYDROCHLORIDE 10 MILLIGRAM(S): 5 TABLET ORAL at 10:30

## 2022-02-11 NOTE — PROGRESS NOTE ADULT - SUBJECTIVE AND OBJECTIVE BOX
Patient seen and examined  Chart reviewed    Afeb  Sitting up in chair  Pain controlled    Dsg changed    Still with sig sero-sang drainage - left drain    HCT stable    Some superficial numbness lateral right thigh  Motor intact    OK post op  Continue PT  watch drain    May send home with drain if output continues    NILAY Joshua MD

## 2022-02-11 NOTE — PROGRESS NOTE ADULT - SUBJECTIVE AND OBJECTIVE BOX
PCP: Dr Tee Bernstein    CHIEF COMPLAINT: chronic back pain    HISTORY OF THE PRESENT ILLNESS: this is a 65 yo female with pmh: HTN, HLD, anxiety, Lumbar spinal stenosis with peripheral neuropathy,  obesity, pre-diabetes, sciatica spondylolisthesis with cc of chronic back progressive pain worsening over years, treated conservatively without relief. Pt now has pain when standing or walking great distances, with numbness in feet, leg weakness and sciatica.  She is seen on [facility],, OOB to chair, having moderate pain at incisional area, using PCA dilaudid , with good effect.  Denies any lightheadedness, dizziness, CP or sob.  We are consulted for medical management    PAST MEDICAL HISTORY: as above  PAST SURGICAL HISTORY: colonoscopy, oral surgery, right THR  FAMILY HISTORY:   Mother: dec age 72, colon Ca, father dec age 86, traumatic injurr  SOCIAL HISTORY: former smoker 1/2 ppd x 15 years, quit 28 years ago, social ETOH, no rec drug  ALLERGIES: NKDA  HOME MEDS: see med rec    2/10/22- up in a chair, Lozano removed- voiding. Dressing was changed and dry. GEOVANI- 215cc  2/11/22- ambulated with PT. GEOVANI- 210cc    REVIEW OF SYSTEMS:   All 10 systems reviewed in detailed and found to be negative with the exception of what has already been described above    Vital Signs Last 24 Hrs  T(C): 36.6 (11 Feb 2022 09:11), Max: 36.9 (10 Feb 2022 20:04)  T(F): 97.8 (11 Feb 2022 09:11), Max: 98.4 (10 Feb 2022 20:04)  HR: 86 (11 Feb 2022 09:11) (85 - 96)  BP: 108/59 (11 Feb 2022 09:11) (104/45 - 113/55)  BP(mean): 70 (11 Feb 2022 09:11) (70 - 70)  RR: 16 (11 Feb 2022 09:11) (16 - 17)  SpO2: 95% (11 Feb 2022 09:11) (95% - 98%)    PHYSICAL EXAM:  GENERAL: Comfortable, no acute distress   HEAD:  Normocephalic, atraumatic  EYES: EOMI, PERRLA  HEENT: Moist mucous membranes  NECK: Supple, No JVD  NERVOUS SYSTEM:  Alert & Oriented X3, Motor Strength 5/5 B/L upper and lower extremities  CHEST/LUNG: Clear to auscultation bilaterally  HEART: Regular rate and rhythm  ABDOMEN: Soft, non tender, Nondistended, Bowel sounds present  GENITOURINARY: voiding  EXTREMITIES:   No clubbing, cyanosis, or edema  MUSCULOSKELETAL- low posterior back dsg, dry, GEOVANI+  SKIN-no rash    LABS:                        10.2   9.41  )-----------( 207      ( 11 Feb 2022 07:46 )             31.9     11 Feb 2022 07:46    140    |  109    |  10     ----------------------------<  122    4.0     |  27     |  0.66     Ca    8.9        11 Feb 2022 07:46    MEDICATIONS  (STANDING):  atorvastatin Oral Tab/Cap - Peds 10 milliGRAM(s) Oral daily  celecoxib 200 milliGRAM(s) Oral every 12 hours  cyclobenzaprine 10 milliGRAM(s) Oral every 8 hours  gabapentin 600 milliGRAM(s) Oral three times a day  lactated ringers. 1000 milliLiter(s) (100 mL/Hr) IV Continuous <Continuous>  levothyroxine 50 MICROGram(s) Oral daily  metoprolol succinate  milliGRAM(s) Oral daily  senna 2 Tablet(s) Oral at bedtime    MEDICATIONS  (PRN):  HYDROmorphone  Injectable 0.5 milliGRAM(s) SubCutaneous every 4 hours PRN Severe Pain (7 - 10)  magnesium hydroxide Suspension 30 milliLiter(s) Oral every 12 hours PRN Constipation  naloxone Injectable 0.1 milliGRAM(s) IV Push every 3 minutes PRN For ANY of the following changes in patient status:  A. RR LESS THAN 10 breaths per minute, B. Oxygen saturation LESS THAN 90%, C. Sedation score of 6  ondansetron Injectable 4 milliGRAM(s) IV Push every 6 hours PRN Nausea  ondansetron Injectable 4 milliGRAM(s) IV Push every 6 hours PRN Nausea  oxyCODONE    IR 5 milliGRAM(s) Oral every 4 hours PRN Mild Pain (1 - 3)  oxyCODONE    IR 10 milliGRAM(s) Oral every 4 hours PRN Moderate Pain (4 - 6)    IMPRESSION:   65 yo female with above pmh a/w:  #spinal stenosis  #s/p lumbar lami with fusion and instrumentation POD#3  Spine f/u appreciated  Pain meds- off PCA, on oxy/ dilaudid prn  Lozano removed- voiding  Dressing changed and dry  Monitor GEOVANI drain output- still quite significant  Bowel regimen  Incentive spirometry  Ambulate    #Acute blood loss   d/t surgery   asymptomatic   no need for transfusion presently   monitor hh    # Leukocytosis  likely reactive  afebrile  monitor wbc    # HTN  cont bb    #HLD  cont statin    #DMII:  -hgba1c 6.5  -consistent carb diet  -ss    # hypothyroidism  cont levothyroxine    # Vte prophylaxis  -venodynes    Pt has not been vaccinated with Covid-19     #Dispo- likely home when clinically stable, per spine.

## 2022-02-11 NOTE — PROGRESS NOTE ADULT - SUBJECTIVE AND OBJECTIVE BOX
POD#3. Pt seen ambulating with physical therapy down the hallway w/o complications. PT has incisional site soreness. She has soreness of the right quad with occasional numbness. Voided on own without complications.  Pain is managed with current meds.     PE  Gen appearance: NAD  motor strength: 5/5 of b/l HF, quads, HF, ant tibs, gastrocs, and EHL  Sensation: intact to light touch bilat. No numbness on exam today.  Incisional site: saturated dressing. Wound is clean with no active drainage. Drain: 210cc over 24 hours last shift 70cc  No calf tenderness bilat.     Plan  Afeb, BP: 108/59 eval per MED  WBC: 9.41, H/H: 10.2/31.9  Mobilize with physical therapy and encouraged incentive spirometr.  Dressing changed. Drain kept. Monitor drain.   Continue analgesics.

## 2022-02-12 ENCOUNTER — TRANSCRIPTION ENCOUNTER (OUTPATIENT)
Age: 67
End: 2022-02-12

## 2022-02-12 VITALS
DIASTOLIC BLOOD PRESSURE: 56 MMHG | OXYGEN SATURATION: 97 % | SYSTOLIC BLOOD PRESSURE: 117 MMHG | HEART RATE: 85 BPM | RESPIRATION RATE: 16 BRPM | TEMPERATURE: 98 F

## 2022-02-12 PROCEDURE — 99232 SBSQ HOSP IP/OBS MODERATE 35: CPT

## 2022-02-12 RX ORDER — OXYCODONE HYDROCHLORIDE 5 MG/1
1 TABLET ORAL
Qty: 0 | Refills: 0 | DISCHARGE
Start: 2022-02-12

## 2022-02-12 RX ORDER — CYCLOBENZAPRINE HYDROCHLORIDE 10 MG/1
1 TABLET, FILM COATED ORAL
Qty: 0 | Refills: 0 | DISCHARGE
Start: 2022-02-12

## 2022-02-12 RX ADMIN — GABAPENTIN 600 MILLIGRAM(S): 400 CAPSULE ORAL at 06:24

## 2022-02-12 RX ADMIN — OXYCODONE HYDROCHLORIDE 10 MILLIGRAM(S): 5 TABLET ORAL at 08:10

## 2022-02-12 RX ADMIN — OXYCODONE HYDROCHLORIDE 10 MILLIGRAM(S): 5 TABLET ORAL at 08:55

## 2022-02-12 RX ADMIN — Medication 50 MICROGRAM(S): at 06:24

## 2022-02-12 RX ADMIN — CYCLOBENZAPRINE HYDROCHLORIDE 10 MILLIGRAM(S): 10 TABLET, FILM COATED ORAL at 06:23

## 2022-02-12 NOTE — DISCHARGE NOTE PROVIDER - NSDCCPTREATMENT_GEN_ALL_CORE_FT
PRINCIPAL PROCEDURE  Procedure: Posterior fusion of lumbar spine with laminectomy  Findings and Treatment: surgery, analgesia, physical therapy

## 2022-02-12 NOTE — DISCHARGE NOTE NURSING/CASE MANAGEMENT/SOCIAL WORK - PATIENT PORTAL LINK FT
You can access the FollowMyHealth Patient Portal offered by [facility] by registering at the following website: [email or web address]. By joining [facility]’s FollowMyHealth portal, you will also be able to view your health information using other applications (apps) compatible with our system.

## 2022-02-12 NOTE — DISCHARGE NOTE PROVIDER - CARE PROVIDER_API CALL
Jazzy Joshua)  Orthopaedic Surgery  [address] [place] [address], [address] [place] [address]  Phone: [phone]  Fax: [phone]  Follow Up Time:

## 2022-02-12 NOTE — PROGRESS NOTE ADULT - SUBJECTIVE AND OBJECTIVE BOX
PCP: Dr Tee Bernstein    C/c: back pain    HPI: 66F, pmh of HTN, HLD, anxiety, Lumbar spinal stenosis with peripheral neuropathy,  obesity, pre-diabetes, sciatica spondylolisthesis with cc of chronic back progressive pain worsening over years, treated conservatively without relief. Pt now has pain when standing or walking great distances, with numbness in feet, leg weakness and sciatica. She is admitted for lumbar lami/fusion   Hospitalist service consulted for medical comanagement.     pt seen and examined on [facility]. Doing well. no acute overnight events. GEOVANI still with outpt. No sob/chest pain. tolerating po. No difficulty voiding.       REVIEW OF SYSTEMS:   All 10 systems reviewed in detailed and found to be negative with the exception of what has already been described above    Vital Signs Last 24 Hrs  T(C): 36.7 (12 Feb 2022 08:32), Max: 36.8 (12 Feb 2022 01:13)  T(F): 98 (12 Feb 2022 08:32), Max: 98.2 (12 Feb 2022 01:13)  HR: 85 (12 Feb 2022 08:32) (82 - 89)  BP: 117/56 (12 Feb 2022 08:32) (106/48 - 118/60)  RR: 16 (12 Feb 2022 08:32) (16 - 16)  SpO2: 97% (12 Feb 2022 08:32) (97% - 98%)    PHYSICAL EXAM:  GENERAL: Comfortable, no acute distress   HEAD:  Normocephalic, atraumatic  EYES: EOMI, PERRLA  HEENT: Moist mucous membranes  NECK: Supple, No JVD  NERVOUS SYSTEM:  Alert & Oriented X3, Motor Strength 5/5 B/L upper and lower extremities  CHEST/LUNG: Clear to auscultation bilaterally  HEART: Regular rate and rhythm  ABDOMEN: Soft, non tender, Nondistended, Bowel sounds present  GENITOURINARY: voiding  EXTREMITIES:   No clubbing, cyanosis, or edema  MUSCULOSKELETAL- low posterior back dsg, dry, GEOVANI+  SKIN-no rash  LABS:                        10.2   9.41  )-----------( 207      ( 11 Feb 2022 07:46 )             31.9     02-11    140  |  109<H>  |  10  ----------------------------<  122<H>  4.0   |  27  |  0.66    Ca    8.9      11 Feb 2022 07:46      MEDICATIONS  (STANDING):  atorvastatin Oral Tab/Cap - Peds 10 milliGRAM(s) Oral daily  cyclobenzaprine 10 milliGRAM(s) Oral every 8 hours  gabapentin 600 milliGRAM(s) Oral three times a day  lactated ringers. 1000 milliLiter(s) (100 mL/Hr) IV Continuous <Continuous>  levothyroxine 50 MICROGram(s) Oral daily  metoprolol succinate  milliGRAM(s) Oral daily  senna 2 Tablet(s) Oral at bedtime    MEDICATIONS  (PRN):  aluminum hydroxide/magnesium hydroxide/simethicone Suspension 30 milliLiter(s) Oral every 4 hours PRN Dyspepsia  HYDROmorphone  Injectable 0.5 milliGRAM(s) SubCutaneous every 4 hours PRN Severe Pain (7 - 10)  magnesium hydroxide Suspension 30 milliLiter(s) Oral every 12 hours PRN Constipation  naloxone Injectable 0.1 milliGRAM(s) IV Push every 3 minutes PRN For ANY of the following changes in patient status:  A. RR LESS THAN 10 breaths per minute, B. Oxygen saturation LESS THAN 90%, C. Sedation score of 6  ondansetron Injectable 4 milliGRAM(s) IV Push every 6 hours PRN Nausea  ondansetron Injectable 4 milliGRAM(s) IV Push every 6 hours PRN Nausea  oxyCODONE    IR 5 milliGRAM(s) Oral every 4 hours PRN Mild Pain (1 - 3)  oxyCODONE    IR 10 milliGRAM(s) Oral every 4 hours PRN Moderate Pain (4 - 6)      ASSESSMENT AND PLAN:  66f, PMH as above a/w    #Spinal stenosis  #s/p lumbar lami with fusion and instrumentation POD#4  -to be discharged with GEOVANI per spine.   -pt to call monday for appt.   -doing well with physical therapy  -oxycodone/tylenol for pain control  -incentive spirometry  -bowel regimen.     #Acute blood loss anemia:  -d/t surgery  -h/h now stable.      d/t surgery   asymptomatic   no need for transfusion presently   monitor hh    # Leukocytosis  -reactive  -resolved    # HTN  -cont bb    #HLD  -cont statin    #DMII:  -hgba1c 6.5  -consistent carb diet  -ss    # hypothyroidism  -levothyroxine    # Vte prophylaxis  -venodynes

## 2022-02-12 NOTE — DISCHARGE NOTE PROVIDER - NSDCFUADDINST_GEN_ALL_CORE_FT
Continue lumbar bracing when out of bed. No showering or bath until drain removed. Contact [facility] on Mon am (2/14/22) to advise of drain outputs on Sat (2/12 and Sun (2/13) and to determine/schedule follow up for drain removal in-office. NO driving. Avoid bending/lifting.

## 2022-02-12 NOTE — DISCHARGE NOTE PROVIDER - NSDCMRMEDTOKEN_GEN_ALL_CORE_FT
atorvastatin 10 mg oral tablet: 1 tab(s) orally once a day  biotin: orally once a day  cyclobenzaprine 10 mg oral tablet: 1 tab(s) orally every 8 hours  gabapentin 600 mg oral tablet: 1 tab(s) orally 3 times a day  levothyroxine 50 mcg (0.05 mg) oral tablet: 1 tab(s) orally once a day  Magnesium: orally once a day  metoprolol succinate 100 mg oral tablet, extended release: 1 tab(s) orally once a day  oxyCODONE 5 mg oral tablet: 1 tab(s) orally every 4 hours, As needed, Mild Pain (1 - 3)  Vitamin B12: orally once a day  Vitamin B6: orally once a day  Vitamin D3: orally once a day

## 2022-02-12 NOTE — DISCHARGE NOTE PROVIDER - HOSPITAL COURSE
The patient was admitted on 02- for elective L2-5 laminectomy, fusion done on the day of admission. The patient tolerated surgery without complication and was transferred to  from the recovery room. POD #1 - VSS, afebrile, neuro/motor intact, numbness in feet unchanged from preoperative state, WBC 14, H/H 9.7/30.1, drain 80cc - kept, PCA/malhotra discontinued, pt began to mobilize w/PT. POD #2 - voiding, WBC 10.59, H/H 9.3/29.5, drain 215cc - kept, neuro/motor intact, VSS, afebrile, mobilizing w/PT. POD #3 drain 210cc - kept, pain well-controlled, WBC wnl, H/H improved to 10.2/31.9, neuro/motor intact, VSS, afebrile, +flatus. POD #4 - VSS, afebrile, neuro/motor intact, labs stable, did stairs w/PT, ambulating well w/walker, drain 65cc last 12H, 195cc last 24H - cleared by Dr. Joshua to go home w/drain - dressing changed, drain secured. Patient is subsequently discharged home in stable condition.

## 2022-02-12 NOTE — DISCHARGE NOTE NURSING/CASE MANAGEMENT/SOCIAL WORK - NSDCPEFALRISK_GEN_ALL_CORE
For information on Fall & Injury Prevention, visit: [email or web address] OR  [email or web address] OR  https://www.cdc.gov/steadi/patient.html

## 2022-02-22 DIAGNOSIS — D72.828 OTHER ELEVATED WHITE BLOOD CELL COUNT: ICD-10-CM

## 2022-02-22 DIAGNOSIS — M41.86 OTHER FORMS OF SCOLIOSIS, LUMBAR REGION: ICD-10-CM

## 2022-02-22 DIAGNOSIS — E78.5 HYPERLIPIDEMIA, UNSPECIFIED: ICD-10-CM

## 2022-02-22 DIAGNOSIS — E66.9 OBESITY, UNSPECIFIED: ICD-10-CM

## 2022-02-22 DIAGNOSIS — E03.9 HYPOTHYROIDISM, UNSPECIFIED: ICD-10-CM

## 2022-02-22 DIAGNOSIS — Z87.891 PERSONAL HISTORY OF NICOTINE DEPENDENCE: ICD-10-CM

## 2022-02-22 DIAGNOSIS — M48.062 SPINAL STENOSIS, LUMBAR REGION WITH NEUROGENIC CLAUDICATION: ICD-10-CM

## 2022-02-22 DIAGNOSIS — Z79.1 LONG TERM (CURRENT) USE OF NON-STEROIDAL ANTI-INFLAMMATORIES (NSAID): ICD-10-CM

## 2022-02-22 DIAGNOSIS — M43.16 SPONDYLOLISTHESIS, LUMBAR REGION: ICD-10-CM

## 2022-02-22 DIAGNOSIS — R73.03 PREDIABETES: ICD-10-CM

## 2022-02-22 DIAGNOSIS — I10 ESSENTIAL (PRIMARY) HYPERTENSION: ICD-10-CM

## 2022-02-22 DIAGNOSIS — E11.42 TYPE 2 DIABETES MELLITUS WITH DIABETIC POLYNEUROPATHY: ICD-10-CM

## 2022-02-22 DIAGNOSIS — F41.9 ANXIETY DISORDER, UNSPECIFIED: ICD-10-CM

## 2022-02-22 DIAGNOSIS — D62 ACUTE POSTHEMORRHAGIC ANEMIA: ICD-10-CM

## 2022-02-22 DIAGNOSIS — M53.2X6 SPINAL INSTABILITIES, LUMBAR REGION: ICD-10-CM

## 2022-02-22 DIAGNOSIS — Z96.641 PRESENCE OF RIGHT ARTIFICIAL HIP JOINT: ICD-10-CM

## 2023-07-24 ENCOUNTER — OFFICE (OUTPATIENT)
Dept: URBAN - METROPOLITAN AREA CLINIC 103 | Facility: CLINIC | Age: 68
Setting detail: OPHTHALMOLOGY
End: 2023-07-24
Payer: MEDICARE

## 2023-07-24 DIAGNOSIS — H43.393: ICD-10-CM

## 2023-07-24 DIAGNOSIS — H25.13: ICD-10-CM

## 2023-07-24 DIAGNOSIS — H43.812: ICD-10-CM

## 2023-07-24 DIAGNOSIS — H40.053: ICD-10-CM

## 2023-07-24 PROCEDURE — 92250 FUNDUS PHOTOGRAPHY W/I&R: CPT | Performed by: OPHTHALMOLOGY

## 2023-07-24 PROCEDURE — 92020 GONIOSCOPY: CPT | Performed by: OPHTHALMOLOGY

## 2023-07-24 PROCEDURE — 92012 INTRM OPH EXAM EST PATIENT: CPT | Performed by: OPHTHALMOLOGY

## 2023-07-24 ASSESSMENT — SUPERFICIAL PUNCTATE KERATITIS (SPK)
OD_SPK: T
OS_SPK: T

## 2023-07-24 ASSESSMENT — REFRACTION_MANIFEST
OD_ADD: +2.50
OS_CYLINDER: -2.25
OD_VA2: 20/25(J1)
OU_VA: 20/30
OS_AXIS: 177
OU_VA: 20/30
OS_AXIS: 177
OD_VA2: 20/25(J1)
OS_ADD: +2.50
OD_VA1: 20/30
OD_SPHERE: +4.25
OD_VA1: 20/30
OS_ADD: +2.50
OD_CYLINDER: -2.00
OD_AXIS: 179
OS_SPHERE: +4.00
OD_CYLINDER: -2.00
OD_ADD: +2.50
OS_CYLINDER: -2.25
OD_SPHERE: +4.25
OS_VA1: 20/30
OS_VA2: 20/25(J1)
OD_AXIS: 179
OS_SPHERE: +4.00
OS_VA2: 20/25(J1)
OS_VA1: 20/30

## 2023-07-24 ASSESSMENT — AXIALLENGTH_DERIVED
OD_AL: 5.67
OS_AL: 22.4808
OS_AL: 22.1754
OD_AL: 5.67
OD_AL: 5.67
OS_AL: 22.4808

## 2023-07-24 ASSESSMENT — SPHEQUIV_DERIVED
OD_SPHEQUIV: 2.875
OS_SPHEQUIV: 3.75
OS_SPHEQUIV: 2.875
OD_SPHEQUIV: 3.25
OD_SPHEQUIV: 3.25
OS_SPHEQUIV: 2.875

## 2023-07-24 ASSESSMENT — KERATOMETRY
OD_K1POWER_DIOPTERS: 441.75
OD_AXISANGLE_DEGREES: 088
OS_K2POWER_DIOPTERS: 45.00
OD_K2POWER_DIOPTERS: 44.75
OS_AXISANGLE_DEGREES: 083
OS_K1POWER_DIOPTERS: 42.25

## 2023-07-24 ASSESSMENT — REFRACTION_CURRENTRX
OD_VPRISM_DIRECTION: PROGS
OD_OVR_VA: 20/
OS_ADD: +2.25
OS_OVR_VA: 20/
OS_CYLINDER: -1.75
OS_OVR_VA: 20/
OS_SPHERE: +4.00
OS_AXIS: 170
OS_SPHERE: +4.00
OD_SPHERE: +4.25
OS_CYLINDER: -2.25
OS_AXIS: 168
OD_OVR_VA: 20/
OD_CYLINDER: -2.00
OD_ADD: +2.25
OS_ADD: +2.50
OS_VPRISM_DIRECTION: PROGS
OD_AXIS: 180
OD_ADD: +2.50
OD_CYLINDER: -2.75
OD_VPRISM_DIRECTION: PROGS
OD_AXIS: 180
OD_SPHERE: +4.50
OS_VPRISM_DIRECTION: PROGS

## 2023-07-24 ASSESSMENT — REFRACTION_AUTOREFRACTION
OD_CYLINDER: -1.25
OS_CYLINDER: -2.00
OD_SPHERE: +3.50
OD_AXIS: 008
OS_AXIS: 176
OS_SPHERE: +4.75

## 2023-07-24 ASSESSMENT — VISUAL ACUITY
OD_BCVA: 20/40-
OS_BCVA: 20/30-

## 2023-07-24 ASSESSMENT — PACHYMETRY
OS_CT_CORRECTION: -1
OD_CT_UM: 557
OD_CT_CORRECTION: -1
OS_CT_UM: 557

## 2023-07-24 ASSESSMENT — TONOMETRY
OS_IOP_MMHG: 19
OD_IOP_MMHG: 18
OS_IOP_MMHG: 20
OD_IOP_MMHG: 20

## 2023-07-24 ASSESSMENT — CONFRONTATIONAL VISUAL FIELD TEST (CVF)
OD_FINDINGS: FULL
OS_FINDINGS: FULL

## 2023-07-24 ASSESSMENT — LID POSITION - DERMATOCHALASIS
OD_DERMATOCHALASIS: RUL
OS_DERMATOCHALASIS: LUL

## 2023-09-08 ENCOUNTER — APPOINTMENT (OUTPATIENT)
Dept: NEUROSURGERY | Facility: CLINIC | Age: 68
End: 2023-09-08
Payer: MEDICARE

## 2023-09-08 VITALS
OXYGEN SATURATION: 95 % | SYSTOLIC BLOOD PRESSURE: 156 MMHG | DIASTOLIC BLOOD PRESSURE: 72 MMHG | HEIGHT: 65 IN | TEMPERATURE: 97.8 F | HEART RATE: 93 BPM | BODY MASS INDEX: 31.65 KG/M2 | WEIGHT: 190 LBS

## 2023-09-08 DIAGNOSIS — Z78.9 OTHER SPECIFIED HEALTH STATUS: ICD-10-CM

## 2023-09-08 DIAGNOSIS — Z86.39 PERSONAL HISTORY OF OTHER ENDOCRINE, NUTRITIONAL AND METABOLIC DISEASE: ICD-10-CM

## 2023-09-08 DIAGNOSIS — Z86.79 PERSONAL HISTORY OF OTHER DISEASES OF THE CIRCULATORY SYSTEM: ICD-10-CM

## 2023-09-08 PROCEDURE — 99203 OFFICE O/P NEW LOW 30 MIN: CPT

## 2023-09-08 RX ORDER — IBUPROFEN 800 MG/1
800 TABLET, FILM COATED ORAL
Refills: 0 | Status: ACTIVE | COMMUNITY

## 2023-09-08 RX ORDER — ATORVASTATIN CALCIUM 10 MG/1
10 TABLET, FILM COATED ORAL
Refills: 0 | Status: ACTIVE | COMMUNITY

## 2023-09-08 RX ORDER — GABAPENTIN 800 MG/1
800 TABLET, COATED ORAL
Refills: 0 | Status: ACTIVE | COMMUNITY

## 2023-09-08 RX ORDER — METOPROLOL TARTRATE 100 MG/1
100 TABLET, FILM COATED ORAL
Refills: 0 | Status: ACTIVE | COMMUNITY

## 2023-09-08 RX ORDER — LEVOTHYROXINE SODIUM 50 UG/1
50 CAPSULE ORAL
Refills: 0 | Status: ACTIVE | COMMUNITY

## 2023-09-08 RX ORDER — METFORMIN HYDROCHLORIDE 625 MG/1
TABLET ORAL
Refills: 0 | Status: ACTIVE | COMMUNITY

## 2023-09-14 ENCOUNTER — OFFICE (OUTPATIENT)
Dept: URBAN - METROPOLITAN AREA CLINIC 103 | Facility: CLINIC | Age: 68
Setting detail: OPHTHALMOLOGY
End: 2023-09-14
Payer: MEDICARE

## 2023-09-14 DIAGNOSIS — H02.834: ICD-10-CM

## 2023-09-14 DIAGNOSIS — H25.13: ICD-10-CM

## 2023-09-14 DIAGNOSIS — H40.053: ICD-10-CM

## 2023-09-14 DIAGNOSIS — H02.831: ICD-10-CM

## 2023-09-14 PROCEDURE — 92012 INTRM OPH EXAM EST PATIENT: CPT | Performed by: OPHTHALMOLOGY

## 2023-09-14 ASSESSMENT — REFRACTION_CURRENTRX
OD_CYLINDER: -2.00
OS_OVR_VA: 20/
OS_AXIS: 170
OS_ADD: +2.25
OS_VPRISM_DIRECTION: PROGS
OD_AXIS: 180
OD_SPHERE: +4.25
OD_VPRISM_DIRECTION: PROGS
OS_CYLINDER: -1.75
OS_VPRISM_DIRECTION: PROGS
OS_AXIS: 168
OS_OVR_VA: 20/
OS_CYLINDER: -2.25
OD_ADD: +2.50
OD_VPRISM_DIRECTION: PROGS
OS_SPHERE: +4.00
OD_OVR_VA: 20/
OD_AXIS: 180
OS_SPHERE: +4.00
OD_ADD: +2.25
OD_CYLINDER: -2.75
OD_SPHERE: +4.50
OD_OVR_VA: 20/
OS_ADD: +2.50

## 2023-09-14 ASSESSMENT — SUPERFICIAL PUNCTATE KERATITIS (SPK)
OS_SPK: T
OD_SPK: T

## 2023-09-14 ASSESSMENT — CONFRONTATIONAL VISUAL FIELD TEST (CVF)
OD_FINDINGS: FULL
OS_FINDINGS: FULL

## 2023-09-14 ASSESSMENT — REFRACTION_MANIFEST
OS_SPHERE: +4.00
OS_VA2: 20/25(J1)
OS_SPHERE: +4.00
OU_VA: 20/30
OU_VA: 20/30
OS_AXIS: 177
OD_AXIS: 179
OD_SPHERE: +4.25
OD_VA1: 20/30
OS_ADD: +2.50
OD_CYLINDER: -2.00
OD_CYLINDER: -2.00
OS_VA2: 20/25(J1)
OS_VA1: 20/30
OS_ADD: +2.50
OD_VA2: 20/25(J1)
OD_VA2: 20/25(J1)
OD_SPHERE: +4.25
OS_AXIS: 177
OD_ADD: +2.50
OS_CYLINDER: -2.25
OS_VA1: 20/30
OD_AXIS: 179
OD_VA1: 20/30
OD_ADD: +2.50
OS_CYLINDER: -2.25

## 2023-09-14 ASSESSMENT — REFRACTION_AUTOREFRACTION
OS_CYLINDER: -1.75
OS_AXIS: 169
OD_AXIS: 010
OD_SPHERE: +3.50
OD_CYLINDER: -1.50
OS_SPHERE: +4.00

## 2023-09-14 ASSESSMENT — PACHYMETRY
OD_CT_UM: 557
OD_CT_CORRECTION: -1
OS_CT_CORRECTION: -1
OS_CT_UM: 557

## 2023-09-14 ASSESSMENT — AXIALLENGTH_DERIVED
OS_AL: 22.4808
OD_AL: 22.5989
OS_AL: 22.4808
OS_AL: 22.3927
OD_AL: 22.5989
OD_AL: 22.7791

## 2023-09-14 ASSESSMENT — SPHEQUIV_DERIVED
OD_SPHEQUIV: 2.75
OD_SPHEQUIV: 3.25
OS_SPHEQUIV: 3.125
OS_SPHEQUIV: 2.875
OD_SPHEQUIV: 3.25
OS_SPHEQUIV: 2.875

## 2023-09-14 ASSESSMENT — KERATOMETRY
OD_K2POWER_DIOPTERS: 44.25
OS_K1POWER_DIOPTERS: 42.25
OD_AXISANGLE_DEGREES: 088
OS_K2POWER_DIOPTERS: 45.00
OD_K1POWER_DIOPTERS: 41.50
OS_AXISANGLE_DEGREES: 083

## 2023-09-14 ASSESSMENT — LID POSITION - DERMATOCHALASIS
OD_DERMATOCHALASIS: RUL
OS_DERMATOCHALASIS: LUL

## 2023-09-14 ASSESSMENT — VISUAL ACUITY
OD_BCVA: 20/40-3
OS_BCVA: 20/30-1

## 2023-09-14 ASSESSMENT — TONOMETRY
OS_IOP_MMHG: 18
OD_IOP_MMHG: 18
OD_IOP_MMHG: 21
OS_IOP_MMHG: 20

## 2023-10-17 ENCOUNTER — OFFICE (OUTPATIENT)
Dept: URBAN - METROPOLITAN AREA CLINIC 103 | Facility: CLINIC | Age: 68
Setting detail: OPHTHALMOLOGY
End: 2023-10-17
Payer: MEDICARE

## 2023-10-17 DIAGNOSIS — H25.12: ICD-10-CM

## 2023-10-17 DIAGNOSIS — H25.13: ICD-10-CM

## 2023-10-17 PROCEDURE — 92012 INTRM OPH EXAM EST PATIENT: CPT | Performed by: OPHTHALMOLOGY

## 2023-10-17 PROCEDURE — 92136 OPHTHALMIC BIOMETRY: CPT | Mod: TC | Performed by: OPHTHALMOLOGY

## 2023-10-17 PROCEDURE — 92136 OPHTHALMIC BIOMETRY: CPT | Mod: 26,LT | Performed by: OPHTHALMOLOGY

## 2023-10-17 ASSESSMENT — REFRACTION_MANIFEST
OD_CYLINDER: -2.00
OD_SPHERE: +4.25
OS_CYLINDER: -2.25
OS_ADD: +2.50
OD_VA2: 20/25(J1)
OD_VA1: 20/30
OS_AXIS: 177
OS_AXIS: 177
OU_VA: 20/30
OD_SPHERE: +4.25
OD_VA1: 20/30
OS_VA1: 20/30
OD_VA2: 20/25(J1)
OD_CYLINDER: -2.00
OS_VA1: 20/30
OD_ADD: +2.50
OS_ADD: +2.50
OD_ADD: +2.50
OS_SPHERE: +4.00
OU_VA: 20/30
OD_AXIS: 179
OS_VA2: 20/25(J1)
OS_VA2: 20/25(J1)
OD_AXIS: 179
OS_SPHERE: +4.00
OS_CYLINDER: -2.25

## 2023-10-17 ASSESSMENT — REFRACTION_CURRENTRX
OS_AXIS: 168
OS_CYLINDER: -1.75
OD_VPRISM_DIRECTION: PROGS
OD_ADD: +2.50
OD_SPHERE: +4.50
OS_AXIS: 170
OD_CYLINDER: -2.75
OS_SPHERE: +4.00
OS_ADD: +2.25
OD_SPHERE: +4.25
OS_SPHERE: +4.00
OS_CYLINDER: -2.25
OD_OVR_VA: 20/
OS_OVR_VA: 20/
OD_OVR_VA: 20/
OD_ADD: +2.25
OS_OVR_VA: 20/
OS_VPRISM_DIRECTION: PROGS
OD_VPRISM_DIRECTION: PROGS
OD_AXIS: 180
OS_VPRISM_DIRECTION: PROGS
OD_CYLINDER: -2.00
OD_AXIS: 180
OS_ADD: +2.50

## 2023-10-17 ASSESSMENT — KERATOMETRY
OD_K1POWER_DIOPTERS: 41.75
OS_AXISANGLE_DEGREES: 083
OS_K2POWER_DIOPTERS: 44.75
OS_K1POWER_DIOPTERS: 42.25
OS_AXISANGLE2_DEGREES: 083
OD_AXISANGLE2_DEGREES: 084
OD_K1POWER_DIOPTERS: 41.75
OD_K1K2_AVERAGE: 43.25
OS_CYLPOWER_DEGREES: 2.5
OD_CYLAXISANGLE_DEGREES: 084
OS_K2POWER_DIOPTERS: 44.75
OD_CYLPOWER_DEGREES: 3
OS_CYLAXISANGLE_DEGREES: 083
OS_AXISANGLE_DEGREES: 173
OD_K2POWER_DIOPTERS: 44.75
OD_AXISANGLE_DEGREES: 084
OD_AXISANGLE_DEGREES: 174
OD_K2POWER_DIOPTERS: 44.75
OS_K1POWER_DIOPTERS: 42.25
OS_K1K2_AVERAGE: 43.5

## 2023-10-17 ASSESSMENT — TONOMETRY
OS_IOP_MMHG: 17
OD_IOP_MMHG: 16

## 2023-10-17 ASSESSMENT — SPHEQUIV_DERIVED
OD_SPHEQUIV: 3.25
OS_SPHEQUIV: 3.625
OS_SPHEQUIV: 2.875
OS_SPHEQUIV: 2.875
OD_SPHEQUIV: 3
OD_SPHEQUIV: 3.25

## 2023-10-17 ASSESSMENT — REFRACTION_AUTOREFRACTION
OS_SPHERE: +4.50
OD_CYLINDER: -1.50
OD_AXIS: 001
OD_SPHERE: +3.75
OS_AXIS: 169
OS_CYLINDER: -1.75

## 2023-10-17 ASSESSMENT — PACHYMETRY
OS_CT_UM: 557
OD_CT_CORRECTION: -1
OS_CT_CORRECTION: -1
OD_CT_UM: 557

## 2023-10-17 ASSESSMENT — AXIALLENGTH_DERIVED
OD_AL: 22.4732
OS_AL: 22.5226
OS_AL: 22.2594
OD_AL: 22.5619
OD_AL: 22.4732
OS_AL: 22.5226

## 2023-10-17 ASSESSMENT — VISUAL ACUITY
OS_BCVA: 20/40-3
OD_BCVA: 20/50-1

## 2023-10-17 ASSESSMENT — SUPERFICIAL PUNCTATE KERATITIS (SPK)
OS_SPK: T
OD_SPK: T

## 2023-10-17 ASSESSMENT — LID POSITION - DERMATOCHALASIS
OS_DERMATOCHALASIS: LUL
OD_DERMATOCHALASIS: RUL

## 2023-11-03 ENCOUNTER — OFFICE (OUTPATIENT)
Dept: URBAN - METROPOLITAN AREA CLINIC 105 | Facility: CLINIC | Age: 68
Setting detail: OPHTHALMOLOGY
End: 2023-11-03
Payer: MEDICARE

## 2023-11-03 DIAGNOSIS — H10.433: ICD-10-CM

## 2023-11-03 PROBLEM — H25.13 CATARACT NUCLEAR SCLEROSIS AGE RELATED; BOTH EYES: Status: ACTIVE | Noted: 2023-10-17

## 2023-11-03 PROCEDURE — 92012 INTRM OPH EXAM EST PATIENT: CPT | Performed by: OPHTHALMOLOGY

## 2023-11-03 ASSESSMENT — SPHEQUIV_DERIVED
OD_SPHEQUIV: 3.25
OS_SPHEQUIV: 2.875
OS_SPHEQUIV: 2.875
OD_SPHEQUIV: 3.25
OS_SPHEQUIV: 3.625
OD_SPHEQUIV: 3.25

## 2023-11-03 ASSESSMENT — REFRACTION_CURRENTRX
OD_AXIS: 180
OS_OVR_VA: 20/
OD_SPHERE: +4.25
OS_OVR_VA: 20/
OD_ADD: +2.50
OS_AXIS: 168
OD_SPHERE: +4.50
OD_OVR_VA: 20/
OD_VPRISM_DIRECTION: PROGS
OS_SPHERE: +4.00
OS_SPHERE: +4.00
OD_OVR_VA: 20/
OS_ADD: +2.25
OD_CYLINDER: -2.00
OD_AXIS: 180
OS_ADD: +2.50
OD_ADD: +2.25
OS_VPRISM_DIRECTION: PROGS
OD_VPRISM_DIRECTION: PROGS
OS_CYLINDER: -1.75
OS_AXIS: 170
OS_VPRISM_DIRECTION: PROGS
OS_CYLINDER: -2.25
OD_CYLINDER: -2.75

## 2023-11-03 ASSESSMENT — REFRACTION_MANIFEST
OD_VA2: 20/25(J1)
OU_VA: 20/30
OS_CYLINDER: -2.25
OS_AXIS: 177
OD_ADD: +2.50
OD_CYLINDER: -2.00
OD_AXIS: 179
OS_AXIS: 177
OS_VA2: 20/25(J1)
OD_AXIS: 179
OD_SPHERE: +4.25
OD_VA2: 20/25(J1)
OD_SPHERE: +4.25
OD_VA1: 20/30
OS_CYLINDER: -2.25
OD_VA1: 20/30
OS_SPHERE: +4.00
OS_ADD: +2.50
OD_ADD: +2.50
OS_VA1: 20/30
OS_VA2: 20/25(J1)
OD_CYLINDER: -2.00
OU_VA: 20/30
OS_ADD: +2.50
OS_SPHERE: +4.00
OS_VA1: 20/30

## 2023-11-03 ASSESSMENT — CONFRONTATIONAL VISUAL FIELD TEST (CVF)
OD_FINDINGS: FULL
OS_FINDINGS: FULL

## 2023-11-03 ASSESSMENT — REFRACTION_AUTOREFRACTION
OS_SPHERE: +4.75
OS_AXIS: 175
OD_AXIS: 177
OD_SPHERE: +4.25
OD_CYLINDER: -2.00
OS_CYLINDER: -2.25

## 2023-11-03 ASSESSMENT — SUPERFICIAL PUNCTATE KERATITIS (SPK)
OD_SPK: T
OS_SPK: T

## 2023-11-03 ASSESSMENT — LID POSITION - DERMATOCHALASIS
OD_DERMATOCHALASIS: RUL
OS_DERMATOCHALASIS: LUL

## 2023-11-27 ENCOUNTER — AMBULATORY SURGERY CENTER (OUTPATIENT)
Dept: URBAN - METROPOLITAN AREA SURGERY 4 | Facility: SURGERY | Age: 68
Setting detail: OPHTHALMOLOGY
End: 2023-11-27
Payer: MEDICARE

## 2023-11-27 DIAGNOSIS — H25.12: ICD-10-CM

## 2023-11-27 DIAGNOSIS — H52.212: ICD-10-CM

## 2023-11-27 PROCEDURE — 66984 XCAPSL CTRC RMVL W/O ECP: CPT | Mod: LT | Performed by: OPHTHALMOLOGY

## 2023-11-27 PROCEDURE — V2787 ASTIGMATISM-CORRECT FUNCTION: HCPCS | Performed by: OPHTHALMOLOGY

## 2023-11-28 ENCOUNTER — OFFICE (OUTPATIENT)
Dept: URBAN - METROPOLITAN AREA CLINIC 103 | Facility: CLINIC | Age: 68
Setting detail: OPHTHALMOLOGY
End: 2023-11-28
Payer: MEDICARE

## 2023-11-28 DIAGNOSIS — H25.11: ICD-10-CM

## 2023-11-28 DIAGNOSIS — H25.011: ICD-10-CM

## 2023-11-28 PROBLEM — Z96.1 PSEUDOPHAKIA ; LEFT EYE: Status: ACTIVE | Noted: 2023-11-28

## 2023-11-28 PROBLEM — H10.433 ALLERGIC CONJUNCTIVITIS ; LEFT EYE: Status: ACTIVE | Noted: 2023-11-03

## 2023-11-28 PROCEDURE — 92136 OPHTHALMIC BIOMETRY: CPT | Mod: RT | Performed by: OPHTHALMOLOGY

## 2023-11-28 ASSESSMENT — SUPERFICIAL PUNCTATE KERATITIS (SPK)
OS_SPK: T
OD_SPK: T

## 2023-11-28 ASSESSMENT — REFRACTION_MANIFEST
OS_VA2: 20/25(J1)
OD_VA1: 20/30
OS_AXIS: 177
OU_VA: 20/30
OS_ADD: +2.50
OD_VA2: 20/25(J1)
OD_ADD: +2.50
OS_SPHERE: +4.00
OS_SPHERE: +4.00
OD_ADD: +2.50
OD_AXIS: 179
OS_AXIS: 177
OD_SPHERE: +4.25
OS_VA1: 20/30
OD_AXIS: 179
OD_VA2: 20/25(J1)
OS_VA1: 20/30
OD_CYLINDER: -2.00
OD_SPHERE: +4.25
OS_CYLINDER: -2.25
OD_CYLINDER: -2.00
OS_VA2: 20/25(J1)
OD_VA1: 20/30
OU_VA: 20/30
OS_ADD: +2.50
OS_CYLINDER: -2.25

## 2023-11-28 ASSESSMENT — SPHEQUIV_DERIVED
OD_SPHEQUIV: 3.25
OS_SPHEQUIV: 0.875
OD_SPHEQUIV: 3.25
OS_SPHEQUIV: 2.875
OS_SPHEQUIV: 2.875

## 2023-11-28 ASSESSMENT — REFRACTION_CURRENTRX
OD_AXIS: 180
OS_OVR_VA: 20/
OD_ADD: +2.25
OS_SPHERE: +4.00
OS_VPRISM_DIRECTION: PROGS
OD_SPHERE: +4.25
OD_SPHERE: +4.50
OD_VPRISM_DIRECTION: PROGS
OS_OVR_VA: 20/
OD_CYLINDER: -2.75
OD_OVR_VA: 20/
OD_AXIS: 180
OS_AXIS: 168
OS_VPRISM_DIRECTION: PROGS
OS_CYLINDER: -1.75
OD_ADD: +2.50
OD_VPRISM_DIRECTION: PROGS
OS_ADD: +2.25
OS_SPHERE: +4.00
OS_AXIS: 170
OD_CYLINDER: -2.00
OD_OVR_VA: 20/
OS_CYLINDER: -2.25
OS_ADD: +2.50

## 2023-11-28 ASSESSMENT — CONFRONTATIONAL VISUAL FIELD TEST (CVF)
OS_FINDINGS: FULL
OD_FINDINGS: FULL

## 2023-11-28 ASSESSMENT — REFRACTION_AUTOREFRACTION
OS_CYLINDER: -0.75
OS_AXIS: 107
OS_SPHERE: +1.25

## 2023-11-28 ASSESSMENT — LID POSITION - DERMATOCHALASIS
OD_DERMATOCHALASIS: RUL
OS_DERMATOCHALASIS: LUL

## 2023-12-08 ENCOUNTER — APPOINTMENT (OUTPATIENT)
Dept: NEUROSURGERY | Facility: CLINIC | Age: 68
End: 2023-12-08

## 2023-12-11 ENCOUNTER — AMBULATORY SURGERY CENTER (OUTPATIENT)
Dept: URBAN - METROPOLITAN AREA SURGERY 4 | Facility: SURGERY | Age: 68
Setting detail: OPHTHALMOLOGY
End: 2023-12-11
Payer: MEDICARE

## 2023-12-11 DIAGNOSIS — H25.11: ICD-10-CM

## 2023-12-11 DIAGNOSIS — H52.211: ICD-10-CM

## 2023-12-11 PROCEDURE — V2787 ASTIGMATISM-CORRECT FUNCTION: HCPCS | Performed by: OPHTHALMOLOGY

## 2023-12-11 PROCEDURE — 66984 XCAPSL CTRC RMVL W/O ECP: CPT | Mod: 79,RT | Performed by: OPHTHALMOLOGY

## 2023-12-12 ENCOUNTER — RX ONLY (RX ONLY)
Age: 68
End: 2023-12-12

## 2023-12-12 ENCOUNTER — OFFICE (OUTPATIENT)
Dept: URBAN - METROPOLITAN AREA CLINIC 103 | Facility: CLINIC | Age: 68
Setting detail: OPHTHALMOLOGY
End: 2023-12-12
Payer: MEDICARE

## 2023-12-12 DIAGNOSIS — Z96.1: ICD-10-CM

## 2023-12-12 PROCEDURE — 99024 POSTOP FOLLOW-UP VISIT: CPT | Performed by: OPHTHALMOLOGY

## 2023-12-12 ASSESSMENT — REFRACTION_CURRENTRX
OS_SPHERE: +4.00
OD_CYLINDER: -2.00
OS_VPRISM_DIRECTION: PROGS
OD_SPHERE: +4.25
OD_CYLINDER: -2.75
OD_VPRISM_DIRECTION: PROGS
OS_ADD: +2.50
OS_AXIS: 168
OD_SPHERE: +4.50
OD_OVR_VA: 20/
OD_AXIS: 180
OS_AXIS: 170
OS_OVR_VA: 20/
OS_SPHERE: +4.00
OS_CYLINDER: -2.25
OS_OVR_VA: 20/
OD_ADD: +2.25
OD_VPRISM_DIRECTION: PROGS
OS_VPRISM_DIRECTION: PROGS
OD_OVR_VA: 20/
OS_CYLINDER: -1.75
OS_ADD: +2.25
OD_AXIS: 180
OD_ADD: +2.50

## 2023-12-12 ASSESSMENT — SPHEQUIV_DERIVED
OD_SPHEQUIV: 3.25
OD_SPHEQUIV: 3.25
OS_SPHEQUIV: 2.875
OS_SPHEQUIV: 0.375
OD_SPHEQUIV: 0.625
OS_SPHEQUIV: 2.875

## 2023-12-12 ASSESSMENT — REFRACTION_MANIFEST
OD_ADD: +2.50
OD_VA1: 20/30
OS_VA1: 20/30
OS_CYLINDER: -2.25
OS_VA2: 20/25(J1)
OS_ADD: +2.50
OD_ADD: +2.50
OD_SPHERE: +4.25
OS_VA2: 20/25(J1)
OS_ADD: +2.50
OS_VA1: 20/30
OS_AXIS: 177
OD_AXIS: 179
OD_AXIS: 179
OD_CYLINDER: -2.00
OS_SPHERE: +4.00
OS_SPHERE: +4.00
OD_SPHERE: +4.25
OU_VA: 20/30
OD_VA2: 20/25(J1)
OD_VA1: 20/30
OU_VA: 20/30
OS_CYLINDER: -2.25
OD_VA2: 20/25(J1)
OS_AXIS: 177
OD_CYLINDER: -2.00

## 2023-12-12 ASSESSMENT — LID POSITION - DERMATOCHALASIS
OS_DERMATOCHALASIS: LUL
OD_DERMATOCHALASIS: RUL

## 2023-12-12 ASSESSMENT — REFRACTION_AUTOREFRACTION
OS_CYLINDER: -0.75
OD_AXIS: 082
OS_SPHERE: +0.75
OD_CYLINDER: -0.25
OS_AXIS: 097
OD_SPHERE: +0.75

## 2023-12-12 ASSESSMENT — SUPERFICIAL PUNCTATE KERATITIS (SPK)
OD_SPK: T
OS_SPK: T

## 2023-12-12 ASSESSMENT — CONFRONTATIONAL VISUAL FIELD TEST (CVF)
OD_FINDINGS: FULL
OS_FINDINGS: FULL

## 2023-12-12 ASSESSMENT — CORNEAL EDEMA CLINICAL DESCRIPTION: OD_CORNEALEDEMA: 1+

## 2023-12-15 NOTE — PHYSICAL EXAM
[de-identified] : awake, alert interactive, appropriate RLE 5/5 HF/KE/KF/DF/PF/EHL LLE 5/5 HF/KE/KF/DF/PF/EHL SILT negative Patel's bilaterally negative clonus bilaterally narrow-based gait wnl reflexes 2+

## 2023-12-15 NOTE — DATA REVIEWED
[de-identified] : CT L-spine without contrast obtained on 4/11/2023 demonstrates postoperative changes from L2 to pelvis fusion with notable evidence of bone graft and bony fusion across all levels with in situ fusion grade 1/2 L4 on 5 spondylolisthesis.  There is some mild haloing of the right S2 AI screw without any evidence of pullout.  Otherwise no evidence of hardware malfunction or implant failure

## 2023-12-15 NOTE — HISTORY OF PRESENT ILLNESS
[de-identified] : Ms. Franks is a very pleasant 68-year-old woman with longstanding history of low back pain and shooting pain down both of her feet since she was 39 years of age who presents for second opinion for postsurgical pain and persistent difficulty walking.  She previously was seen by Dr. Joshua in late 2020 for evaluation for neurogenic claudication and underwent a L2-L5 decompression and fusion on February 2021.  Immediately after the surgery she reported having difficulty moving and had severe back pain and 2 weeks afterwards was seen again by Dr. Joshua who told her that she had a fractured segment and needed more surgery.  On March 14,2021 she underwent a second surgery but still had significant low back pain.  She notably had some improvement of her sciatica after the second surgery but continued to need a walker to ambulate.  Approximately 3 to 4 months postoperatively she seemed to doing well until approximately 12/2021 when she started having significant paresthesias in her foot bilaterally.  She reports that her pain still requires her to lean forward when walking and she cannot walk more than 200 m without leaning on things.  She reports that her back pain is returned and is as bad as before the first surgery and rates at a 7/10 on average and improved to 4/10 when lying down.  She reports that standing and walking makes back pain worse.  She continues to have shooting pain in her legs approximately twice a week and has been taking gabapentin 800 mg 3 times daily which has not significantly helped her pain.  She denies any derik weakness or bowel bladder incontinence or retention or any saddle anesthesia.

## 2023-12-15 NOTE — ASSESSMENT
[FreeTextEntry1] : Ms. Franks is a very pleasant 68-year-old woman with longstanding history of low back pain and shooting pain in both her feet with neurogenic claudication who underwent a L2-L5 decompression and fusion was complicated by a fractures segment below the required a revision surgery L2 to pelvis all with Dr. Joshua on March 4, 2021 and initially had some improvement in her symptoms but then had a recurrence of symptoms in December 2021.  On exam she remains 5/5 in bilateral lower extremities without any long track findings or hyperreflexia.  CT L-spine demonstrates expected postoperative changes from prior L2 to pelvis fusion with some mild haloing around the right S2 AI screw but evidence of good bony fusion from L2 to pelvis.  We had a long discussion regarding her symptoms.  We discussed that as her sciatica symptoms have significantly improved, we would not be pursuing surgery primarily to relieve her sciatica but rather her longstanding low back pain.  We discussed that in the setting of a successful prior bony fusion in the lower lumbar spine, it would be difficult to create a surgical plan or isolate the specific region to revise or extend her fusion given her generalized low back pain.  We discussed that it may be beneficial to obtain an MRI L-spine without contrast to assess for any element of neural compression.  We discussed that furthermore she may benefit from continued physical therapy.  We discussed having her follow-up in 4 to 6 weeks pending the above.  All questions answered.  Plan: - Follow-up MRI L-spine without contrast - Follow-up in 4 to 6 weeks

## 2024-01-11 ENCOUNTER — OFFICE (OUTPATIENT)
Dept: URBAN - METROPOLITAN AREA CLINIC 103 | Facility: CLINIC | Age: 69
Setting detail: OPHTHALMOLOGY
End: 2024-01-11
Payer: MEDICARE

## 2024-01-11 DIAGNOSIS — Z96.1: ICD-10-CM

## 2024-01-11 PROCEDURE — 99024 POSTOP FOLLOW-UP VISIT: CPT | Performed by: OPHTHALMOLOGY

## 2024-01-11 ASSESSMENT — LID POSITION - DERMATOCHALASIS
OD_DERMATOCHALASIS: RUL
OS_DERMATOCHALASIS: LUL

## 2024-01-11 ASSESSMENT — REFRACTION_CURRENTRX
OS_SPHERE: +4.00
OD_ADD: +2.25
OD_VPRISM_DIRECTION: PROGS
OD_SPHERE: +4.50
OS_SPHERE: +4.00
OS_CYLINDER: -2.25
OD_AXIS: 180
OS_OVR_VA: 20/
OS_ADD: +2.25
OD_OVR_VA: 20/
OD_ADD: +2.50
OS_VPRISM_DIRECTION: PROGS
OD_VPRISM_DIRECTION: PROGS
OD_CYLINDER: -2.00
OS_OVR_VA: 20/
OD_AXIS: 180
OD_SPHERE: +4.25
OS_ADD: +2.50
OS_CYLINDER: -1.75
OD_CYLINDER: -2.75
OS_AXIS: 170
OD_OVR_VA: 20/
OS_VPRISM_DIRECTION: PROGS
OS_AXIS: 168

## 2024-01-11 ASSESSMENT — SPHEQUIV_DERIVED
OD_SPHEQUIV: 0.375
OD_SPHEQUIV: 0
OD_SPHEQUIV: 3.25
OS_SPHEQUIV: 2.875
OS_SPHEQUIV: 0.75
OD_SPHEQUIV: 3.25
OS_SPHEQUIV: 2.875
OS_SPHEQUIV: 0.75

## 2024-01-11 ASSESSMENT — REFRACTION_MANIFEST
OD_ADD: +2.50
OD_VA1: 20/30+1
OD_VA2: 20/25(J1)
OD_ADD: +2.50
OS_SPHERE: +4.00
OS_SPHERE: +4.00
OU_VA: 20/30
OS_CYLINDER: -0.50
OS_CYLINDER: -2.25
OU_VA: 20/30
OS_VA2: 20/25(J1)
OS_AXIS: 177
OD_SPHERE: +4.25
OD_VA1: 20/30
OD_CYLINDER: -0.50
OS_ADD: +2.50
OD_AXIS: 087
OS_ADD: +2.50
OD_AXIS: 179
OD_VA1: 20/30
OS_CYLINDER: -2.25
OD_SPHERE: +0.25
OD_CYLINDER: -2.00
OS_AXIS: 070
OS_SPHERE: +1.00
OD_VA2: 20/25(J1)
OD_AXIS: 179
OS_VA1: 20/30
OS_VA1: 20/30
OS_ADD: +2.50
OS_AXIS: 177
OS_VA2: 20/25(J1)
OS_VA1: 20/25
OD_SPHERE: +4.25
OD_ADD: +2.50
OD_CYLINDER: -2.00

## 2024-01-11 ASSESSMENT — REFRACTION_AUTOREFRACTION
OS_CYLINDER: -0.50
OS_AXIS: 065
OS_SPHERE: +1.00
OD_SPHERE: +0.75
OD_AXIS: 087
OD_CYLINDER: -0.75

## 2024-01-11 ASSESSMENT — SUPERFICIAL PUNCTATE KERATITIS (SPK)
OS_SPK: T
OD_SPK: T

## 2024-01-11 ASSESSMENT — CORNEAL EDEMA CLINICAL DESCRIPTION: OD_CORNEALEDEMA: ABSENT

## 2024-02-22 ENCOUNTER — OFFICE (OUTPATIENT)
Dept: URBAN - METROPOLITAN AREA CLINIC 103 | Facility: CLINIC | Age: 69
Setting detail: OPHTHALMOLOGY
End: 2024-02-22
Payer: MEDICARE

## 2024-02-22 DIAGNOSIS — Z96.1: ICD-10-CM

## 2024-02-22 DIAGNOSIS — H26.493: ICD-10-CM

## 2024-02-22 PROCEDURE — 99024 POSTOP FOLLOW-UP VISIT: CPT | Performed by: OPHTHALMOLOGY

## 2024-02-22 ASSESSMENT — REFRACTION_CURRENTRX
OS_CYLINDER: -2.25
OD_VPRISM_DIRECTION: PROGS
OS_OVR_VA: 20/
OS_CYLINDER: -1.75
OD_CYLINDER: -2.75
OD_SPHERE: +4.25
OD_VPRISM_DIRECTION: PROGS
OD_CYLINDER: -2.00
OD_AXIS: 180
OS_SPHERE: +4.00
OS_AXIS: 168
OD_SPHERE: +4.50
OD_ADD: +2.50
OS_SPHERE: +4.00
OS_VPRISM_DIRECTION: PROGS
OS_ADD: +2.50
OD_OVR_VA: 20/
OS_AXIS: 170
OD_AXIS: 180
OS_VPRISM_DIRECTION: PROGS
OS_ADD: +2.25
OD_ADD: +2.25
OD_OVR_VA: 20/
OS_OVR_VA: 20/

## 2024-02-22 ASSESSMENT — REFRACTION_MANIFEST
OS_VA2: 20/25(J1)
OS_CYLINDER: -0.50
OD_VA1: 20/30+1
OS_CYLINDER: -2.25
OD_SPHERE: +0.25
OD_ADD: +2.50
OS_ADD: +2.50
OD_AXIS: 087
OD_CYLINDER: -2.00
OD_ADD: +2.50
OS_AXIS: 070
OD_AXIS: 179
OS_ADD: +2.50
OS_SPHERE: +4.00
OD_SPHERE: +4.25
OU_VA: 20/30
OS_VA1: 20/25
OD_CYLINDER: -0.50
OD_VA1: 20/30
OS_SPHERE: +1.00
OS_VA1: 20/30
OS_AXIS: 177
OD_VA2: 20/25(J1)

## 2024-02-22 ASSESSMENT — SUPERFICIAL PUNCTATE KERATITIS (SPK)
OS_SPK: T
OD_SPK: T

## 2024-02-22 ASSESSMENT — REFRACTION_AUTOREFRACTION
OS_CYLINDER: -0.75
OD_AXIS: 097
OS_SPHERE: +1.25
OS_AXIS: 095
OD_CYLINDER: -0.25
OD_SPHERE: +0.25

## 2024-02-22 ASSESSMENT — SPHEQUIV_DERIVED
OD_SPHEQUIV: 0.125
OS_SPHEQUIV: 2.875
OS_SPHEQUIV: 0.875
OD_SPHEQUIV: 3.25
OS_SPHEQUIV: 0.75
OD_SPHEQUIV: 0

## 2024-02-22 ASSESSMENT — CORNEAL EDEMA CLINICAL DESCRIPTION: OD_CORNEALEDEMA: ABSENT

## 2024-02-22 ASSESSMENT — LID POSITION - DERMATOCHALASIS
OS_DERMATOCHALASIS: LUL
OD_DERMATOCHALASIS: RUL

## 2024-02-22 ASSESSMENT — CONFRONTATIONAL VISUAL FIELD TEST (CVF)
OS_FINDINGS: FULL
OD_FINDINGS: FULL

## 2024-02-26 ENCOUNTER — APPOINTMENT (OUTPATIENT)
Dept: ORTHOPEDIC SURGERY | Facility: CLINIC | Age: 69
End: 2024-02-26
Payer: MEDICARE

## 2024-02-26 VITALS — BODY MASS INDEX: 30.49 KG/M2 | WEIGHT: 183 LBS | HEIGHT: 65 IN

## 2024-02-26 DIAGNOSIS — M70.62 TROCHANTERIC BURSITIS, LEFT HIP: ICD-10-CM

## 2024-02-26 DIAGNOSIS — M25.552 PAIN IN LEFT HIP: ICD-10-CM

## 2024-02-26 DIAGNOSIS — M54.32 SCIATICA, LEFT SIDE: ICD-10-CM

## 2024-02-26 PROCEDURE — 73502 X-RAY EXAM HIP UNI 2-3 VIEWS: CPT

## 2024-02-26 PROCEDURE — 99203 OFFICE O/P NEW LOW 30 MIN: CPT

## 2024-02-27 PROBLEM — M70.62 TROCHANTERIC BURSITIS OF LEFT HIP: Status: ACTIVE | Noted: 2024-02-27

## 2024-02-27 PROBLEM — M54.32 SCIATICA OF LEFT SIDE: Status: ACTIVE | Noted: 2024-02-27

## 2024-02-27 PROBLEM — M25.552 PAIN OF LEFT HIP JOINT: Status: ACTIVE | Noted: 2024-02-26

## 2024-02-27 RX ORDER — PYRIDOXINE HCL (VITAMIN B6) 100 MG
0 TABLET ORAL
Qty: 0 | Refills: 0 | DISCHARGE

## 2024-02-27 RX ORDER — LEVOTHYROXINE SODIUM 125 MCG
1 TABLET ORAL
Qty: 0 | Refills: 0 | DISCHARGE

## 2024-02-27 RX ORDER — GABAPENTIN 400 MG/1
1 CAPSULE ORAL
Qty: 0 | Refills: 0 | DISCHARGE

## 2024-02-27 RX ORDER — CHOLECALCIFEROL (VITAMIN D3) 125 MCG
0 CAPSULE ORAL
Qty: 0 | Refills: 0 | DISCHARGE

## 2024-02-27 RX ORDER — IBUPROFEN 200 MG
0 TABLET ORAL
Qty: 0 | Refills: 0 | DISCHARGE

## 2024-02-27 RX ORDER — PREGABALIN 225 MG/1
0 CAPSULE ORAL
Qty: 0 | Refills: 0 | DISCHARGE

## 2024-02-27 RX ORDER — ATORVASTATIN CALCIUM 80 MG/1
1 TABLET, FILM COATED ORAL
Qty: 0 | Refills: 0 | DISCHARGE

## 2024-02-27 RX ORDER — METOPROLOL TARTRATE 50 MG
1 TABLET ORAL
Qty: 0 | Refills: 0 | DISCHARGE

## 2024-02-27 NOTE — ASSESSMENT
[FreeTextEntry1] : The patient is a 67 yo woman presenting with 2 months of left hip pain without trauma. He denies paresthesias. The patient is able to complete ADLs as tolerated. Her pain is primarily posterior with radiating pain through her hamstring. The patient had a right JEY with Dr. Beckett a few years ago that is not painful at all. She is able to use stairs and walk without pain on the left. She feels it is tough to bend because of her current pain. She denies all groin pain but has intermittent lateral hip pain. She has used advil with relief.   Left hip exam: The patient presents in no apparent distress. Neurovascularly intact. Sensation intact to left lower extremity. No scars, cuts or abrasions. 2+ pulses to posterior tibialis. ROM is full and painless. + abductor tenderness. - groin pain. + lateral hip tenderness. + radiculopathy. + straight leg raise. 5/5 abductor strength. - pain with forced ER/IR.  Left hip xrays taken today in office, 1 view AP pelvis and two views hip, WB: Mild OA of the left hip. Right JEY in proper alignment. No fractures or loose bodies. No obvious tumors, masses, or lesions.  The patient will consider a diagnostic left hip IA injection. She will continue with home remedies for the sciatica pain. She will call as needed.

## 2024-02-27 NOTE — HISTORY OF PRESENT ILLNESS
[Dull/Aching] : dull/aching [Meds] : meds [] : yes [Walking] : walking [FreeTextEntry5] : 69 y/o F presents for NP eval of the Lt. hip today. Pt reports of chronic hip pain for the past 2 months with no associated trauma. Hx of Rt. THR 15 years ago by Dr. Beckett. Loli as needed.  [FreeTextEntry7] : Groin

## 2024-04-15 ENCOUNTER — APPOINTMENT (OUTPATIENT)
Dept: ORTHOPEDIC SURGERY | Facility: CLINIC | Age: 69
End: 2024-04-15

## 2024-04-19 ENCOUNTER — OFFICE (OUTPATIENT)
Dept: URBAN - METROPOLITAN AREA CLINIC 103 | Facility: CLINIC | Age: 69
Setting detail: OPHTHALMOLOGY
End: 2024-04-19
Payer: MEDICARE

## 2024-04-19 DIAGNOSIS — H40.053: ICD-10-CM

## 2024-04-19 DIAGNOSIS — H16.223: ICD-10-CM

## 2024-04-19 DIAGNOSIS — H10.433: ICD-10-CM

## 2024-04-19 DIAGNOSIS — H02.831: ICD-10-CM

## 2024-04-19 DIAGNOSIS — H26.493: ICD-10-CM

## 2024-04-19 DIAGNOSIS — H02.834: ICD-10-CM

## 2024-04-19 PROCEDURE — 92083 EXTENDED VISUAL FIELD XM: CPT | Performed by: OPHTHALMOLOGY

## 2024-04-19 PROCEDURE — 92133 CPTRZD OPH DX IMG PST SGM ON: CPT | Performed by: OPHTHALMOLOGY

## 2024-04-19 PROCEDURE — 92012 INTRM OPH EXAM EST PATIENT: CPT | Performed by: OPHTHALMOLOGY

## 2024-04-19 ASSESSMENT — LID POSITION - DERMATOCHALASIS
OS_DERMATOCHALASIS: LUL
OD_DERMATOCHALASIS: RUL

## 2024-07-10 ENCOUNTER — OFFICE (OUTPATIENT)
Dept: URBAN - METROPOLITAN AREA CLINIC 105 | Facility: CLINIC | Age: 69
Setting detail: OPHTHALMOLOGY
End: 2024-07-10
Payer: MEDICARE

## 2024-07-10 ENCOUNTER — RX ONLY (RX ONLY)
Age: 69
End: 2024-07-10

## 2024-07-10 DIAGNOSIS — H26.492: ICD-10-CM

## 2024-07-10 PROCEDURE — 66821 AFTER CATARACT LASER SURGERY: CPT | Mod: LT | Performed by: OPHTHALMOLOGY

## 2024-07-10 ASSESSMENT — CONFRONTATIONAL VISUAL FIELD TEST (CVF)
OS_FINDINGS: FULL
OD_FINDINGS: FULL

## 2024-07-10 ASSESSMENT — LID POSITION - DERMATOCHALASIS
OD_DERMATOCHALASIS: RUL
OS_DERMATOCHALASIS: LUL

## 2024-07-17 ENCOUNTER — OFFICE (OUTPATIENT)
Dept: URBAN - METROPOLITAN AREA CLINIC 103 | Facility: CLINIC | Age: 69
Setting detail: OPHTHALMOLOGY
End: 2024-07-17
Payer: MEDICARE

## 2024-07-17 DIAGNOSIS — H26.491: ICD-10-CM

## 2024-07-17 PROBLEM — H26.492 POSTERIOR CAPSULAR OPACIFICATION; RIGHT EYE, LEFT EYE: Status: ACTIVE | Noted: 2024-07-17

## 2024-07-17 PROCEDURE — 99024 POSTOP FOLLOW-UP VISIT: CPT | Performed by: OPHTHALMOLOGY

## 2024-07-17 ASSESSMENT — CONFRONTATIONAL VISUAL FIELD TEST (CVF)
OS_FINDINGS: FULL
OD_FINDINGS: FULL

## 2024-07-17 ASSESSMENT — LID POSITION - DERMATOCHALASIS
OS_DERMATOCHALASIS: LUL
OD_DERMATOCHALASIS: RUL

## 2024-08-05 ENCOUNTER — RX ONLY (RX ONLY)
Age: 69
End: 2024-08-05

## 2024-08-05 ENCOUNTER — OFFICE (OUTPATIENT)
Dept: URBAN - METROPOLITAN AREA CLINIC 105 | Facility: CLINIC | Age: 69
Setting detail: OPHTHALMOLOGY
End: 2024-08-05
Payer: MEDICARE

## 2024-08-05 DIAGNOSIS — H26.493: ICD-10-CM

## 2024-08-05 DIAGNOSIS — H26.492: ICD-10-CM

## 2024-08-05 PROCEDURE — 66821 AFTER CATARACT LASER SURGERY: CPT | Mod: 76 | Performed by: OPHTHALMOLOGY

## 2024-08-05 PROCEDURE — 66821 AFTER CATARACT LASER SURGERY: CPT | Mod: 79,50 | Performed by: OPHTHALMOLOGY

## 2024-08-05 ASSESSMENT — CONFRONTATIONAL VISUAL FIELD TEST (CVF)
OD_FINDINGS: FULL
OS_FINDINGS: FULL

## 2024-08-05 ASSESSMENT — LID POSITION - DERMATOCHALASIS
OS_DERMATOCHALASIS: LUL
OD_DERMATOCHALASIS: RUL

## 2024-11-05 ENCOUNTER — OFFICE (OUTPATIENT)
Dept: URBAN - METROPOLITAN AREA CLINIC 103 | Facility: CLINIC | Age: 69
Setting detail: OPHTHALMOLOGY
End: 2024-11-05
Payer: MEDICARE

## 2024-11-05 DIAGNOSIS — H16.223: ICD-10-CM

## 2024-11-05 DIAGNOSIS — H02.834: ICD-10-CM

## 2024-11-05 DIAGNOSIS — H02.831: ICD-10-CM

## 2024-11-05 DIAGNOSIS — H40.053: ICD-10-CM

## 2024-11-05 DIAGNOSIS — H43.393: ICD-10-CM

## 2024-11-05 DIAGNOSIS — H43.812: ICD-10-CM

## 2024-11-05 DIAGNOSIS — H10.433: ICD-10-CM

## 2024-11-05 PROCEDURE — 92250 FUNDUS PHOTOGRAPHY W/I&R: CPT | Performed by: OPHTHALMOLOGY

## 2024-11-05 PROCEDURE — 92014 COMPRE OPH EXAM EST PT 1/>: CPT | Performed by: OPHTHALMOLOGY

## 2024-11-05 ASSESSMENT — CORNEAL EDEMA CLINICAL DESCRIPTION: OD_CORNEALEDEMA: ABSENT

## 2024-11-05 ASSESSMENT — REFRACTION_CURRENTRX
OD_OVR_VA: 20/
OD_VPRISM_DIRECTION: PROGS
OS_CYLINDER: -1.75
OS_SPHERE: +4.00
OD_VPRISM_DIRECTION: PROGS
OD_OVR_VA: 20/
OD_CYLINDER: -2.00
OS_ADD: +2.25
OD_CYLINDER: -2.75
OS_ADD: +2.50
OS_AXIS: 170
OS_OVR_VA: 20/
OS_OVR_VA: 20/
OD_SPHERE: +4.50
OD_SPHERE: +4.25
OD_AXIS: 180
OS_SPHERE: +4.00
OD_ADD: +2.25
OS_VPRISM_DIRECTION: PROGS
OD_AXIS: 180
OS_AXIS: 168
OS_VPRISM_DIRECTION: PROGS
OD_ADD: +2.50
OS_CYLINDER: -2.25

## 2024-11-05 ASSESSMENT — REFRACTION_MANIFEST
OS_ADD: +2.50
OS_VA2: 20/25(J1)
OD_VA2: 20/25(J1)
OD_CYLINDER: -0.50
OU_VA: 20/30
OS_CYLINDER: -2.25
OS_SPHERE: +4.00
OD_VA1: 20/30+1
OS_AXIS: 177
OS_VA1: 20/30
OD_CYLINDER: -2.00
OD_SPHERE: +0.25
OS_SPHERE: +1.00
OS_VA1: 20/25
OS_ADD: +2.50
OD_ADD: +2.50
OS_AXIS: 070
OD_AXIS: 179
OS_CYLINDER: -0.50
OD_VA1: 20/30
OD_SPHERE: +4.25
OD_AXIS: 087
OD_ADD: +2.50

## 2024-11-05 ASSESSMENT — LID POSITION - DERMATOCHALASIS
OD_DERMATOCHALASIS: RUL
OS_DERMATOCHALASIS: LUL

## 2024-11-05 ASSESSMENT — REFRACTION_AUTOREFRACTION
OD_SPHERE: +0.50
OS_SPHERE: +1.25
OS_CYLINDER: -0.50
OS_AXIS: 073
OD_AXIS: 109
OD_CYLINDER: -0.50

## 2024-11-05 ASSESSMENT — KERATOMETRY
OD_AXISANGLE_DEGREES: 087
OS_AXISANGLE_DEGREES: 086
OD_K1POWER_DIOPTERS: 42.00
OS_K1POWER_DIOPTERS: 42.75
OS_K2POWER_DIOPTERS: 42.25
OD_K2POWER_DIOPTERS: 44.75

## 2024-11-05 ASSESSMENT — PACHYMETRY
OS_CT_CORRECTION: -1
OS_CT_UM: 557
OD_CT_UM: 557
OD_CT_CORRECTION: -1

## 2024-11-05 ASSESSMENT — CONFRONTATIONAL VISUAL FIELD TEST (CVF)
OS_FINDINGS: FULL
OD_FINDINGS: FULL

## 2024-11-05 ASSESSMENT — TONOMETRY
OD_IOP_MMHG: 16
OS_IOP_MMHG: 17

## 2024-11-05 ASSESSMENT — VISUAL ACUITY
OS_BCVA: 20/25-1
OD_BCVA: 20/50+2

## 2024-11-05 ASSESSMENT — SUPERFICIAL PUNCTATE KERATITIS (SPK)
OD_SPK: T
OS_SPK: T

## 2025-05-06 ENCOUNTER — OFFICE (OUTPATIENT)
Dept: URBAN - METROPOLITAN AREA CLINIC 103 | Facility: CLINIC | Age: 70
Setting detail: OPHTHALMOLOGY
End: 2025-05-06
Payer: MEDICARE

## 2025-05-06 DIAGNOSIS — H00.15: ICD-10-CM

## 2025-05-06 PROCEDURE — 99213 OFFICE O/P EST LOW 20 MIN: CPT | Performed by: OPHTHALMOLOGY

## 2025-05-06 ASSESSMENT — REFRACTION_AUTOREFRACTION
OD_CYLINDER: -0.50
OS_CYLINDER: -0.75
OS_AXIS: 087
OD_SPHERE: +0.50
OS_SPHERE: +1.00
OD_AXIS: 113

## 2025-05-06 ASSESSMENT — TONOMETRY
OS_IOP_MMHG: 18
OD_IOP_MMHG: 17

## 2025-05-06 ASSESSMENT — KERATOMETRY
OD_AXISANGLE_DEGREES: 086
OS_K2POWER_DIOPTERS: 44.50
OS_K1POWER_DIOPTERS: 42.25
OS_AXISANGLE_DEGREES: 083
OD_K2POWER_DIOPTERS: 44.50
OD_K1POWER_DIOPTERS: 42.00

## 2025-05-06 ASSESSMENT — REFRACTION_CURRENTRX
OS_OVR_VA: 20/
OD_CYLINDER: -2.00
OD_OVR_VA: 20/
OS_CYLINDER: -2.25
OS_CYLINDER: -1.75
OS_VPRISM_DIRECTION: PROGS
OS_ADD: +2.25
OS_SPHERE: +4.00
OS_ADD: +2.50
OD_VPRISM_DIRECTION: PROGS
OD_AXIS: 180
OS_AXIS: 170
OS_SPHERE: +4.00
OD_OVR_VA: 20/
OD_CYLINDER: -2.75
OD_AXIS: 180
OD_ADD: +2.25
OS_OVR_VA: 20/
OS_VPRISM_DIRECTION: PROGS
OD_SPHERE: +4.25
OS_AXIS: 168
OD_VPRISM_DIRECTION: PROGS
OD_SPHERE: +4.50
OD_ADD: +2.50

## 2025-05-06 ASSESSMENT — REFRACTION_MANIFEST
OS_ADD: +2.50
OS_AXIS: 177
OD_VA1: 20/30
OS_VA2: 20/25(J1)
OS_CYLINDER: -0.50
OD_CYLINDER: -2.00
OS_VA1: 20/25
OS_CYLINDER: -2.25
OU_VA: 20/30
OS_ADD: +2.50
OD_AXIS: 179
OS_SPHERE: +1.00
OD_SPHERE: +0.25
OD_AXIS: 087
OD_VA1: 20/30+1
OD_VA2: 20/25(J1)
OD_ADD: +2.50
OS_SPHERE: +4.00
OS_VA1: 20/30
OD_SPHERE: +4.25
OD_ADD: +2.50
OD_CYLINDER: -0.50
OS_AXIS: 070

## 2025-05-06 ASSESSMENT — LID POSITION - DERMATOCHALASIS
OS_DERMATOCHALASIS: LUL
OD_DERMATOCHALASIS: RUL

## 2025-05-06 ASSESSMENT — PACHYMETRY
OD_CT_CORRECTION: -1
OD_CT_UM: 557
OS_CT_UM: 557
OS_CT_CORRECTION: -1

## 2025-05-06 ASSESSMENT — CONFRONTATIONAL VISUAL FIELD TEST (CVF)
OD_FINDINGS: FULL
OS_FINDINGS: FULL

## 2025-05-06 ASSESSMENT — CORNEAL EDEMA CLINICAL DESCRIPTION: OD_CORNEALEDEMA: ABSENT

## 2025-05-06 ASSESSMENT — SUPERFICIAL PUNCTATE KERATITIS (SPK)
OD_SPK: T
OS_SPK: T

## 2025-05-06 ASSESSMENT — VISUAL ACUITY
OD_BCVA: 20/40
OS_BCVA: 20/40